# Patient Record
Sex: FEMALE | Race: WHITE | NOT HISPANIC OR LATINO | Employment: OTHER | URBAN - METROPOLITAN AREA
[De-identification: names, ages, dates, MRNs, and addresses within clinical notes are randomized per-mention and may not be internally consistent; named-entity substitution may affect disease eponyms.]

---

## 2017-10-24 ENCOUNTER — TRANSCRIBE ORDERS (OUTPATIENT)
Dept: ADMINISTRATIVE | Facility: HOSPITAL | Age: 67
End: 2017-10-24

## 2017-10-24 DIAGNOSIS — R92.8 ABNORMAL MAMMOGRAM: Primary | ICD-10-CM

## 2017-11-03 ENCOUNTER — APPOINTMENT (OUTPATIENT)
Dept: MAMMOGRAPHY | Facility: HOSPITAL | Age: 67
End: 2017-11-03
Attending: INTERNAL MEDICINE

## 2017-12-06 ENCOUNTER — APPOINTMENT (OUTPATIENT)
Dept: MAMMOGRAPHY | Facility: HOSPITAL | Age: 67
End: 2017-12-06
Attending: INTERNAL MEDICINE

## 2017-12-19 ENCOUNTER — HOSPITAL ENCOUNTER (OUTPATIENT)
Dept: MAMMOGRAPHY | Facility: HOSPITAL | Age: 67
Discharge: HOME | End: 2017-12-19
Attending: INTERNAL MEDICINE | Admitting: INTERNAL MEDICINE

## 2017-12-19 DIAGNOSIS — R92.8 ABNORMAL MAMMOGRAM: ICD-10-CM

## 2017-12-19 PROCEDURE — G0279 TOMOSYNTHESIS, MAMMO: HCPCS

## 2017-12-19 PROCEDURE — G0279 TOMOSYNTHESIS, MAMMO: HCPCS | Performed by: RADIOLOGY

## 2017-12-19 PROCEDURE — G0204 DX MAMMO INCL CAD BI: HCPCS

## 2017-12-19 PROCEDURE — G0204 DX MAMMO INCL CAD BI: HCPCS | Performed by: RADIOLOGY

## 2017-12-21 ENCOUNTER — APPOINTMENT (OUTPATIENT)
Dept: MAMMOGRAPHY | Facility: HOSPITAL | Age: 67
End: 2017-12-21
Attending: INTERNAL MEDICINE

## 2019-01-08 ENCOUNTER — TRANSCRIBE ORDERS (OUTPATIENT)
Dept: MAMMOGRAPHY | Facility: HOSPITAL | Age: 69
End: 2019-01-08

## 2019-01-08 ENCOUNTER — HOSPITAL ENCOUNTER (OUTPATIENT)
Dept: MAMMOGRAPHY | Facility: HOSPITAL | Age: 69
Discharge: HOME | End: 2019-01-08
Attending: INTERNAL MEDICINE | Admitting: INTERNAL MEDICINE

## 2019-01-08 DIAGNOSIS — Z12.31 VISIT FOR SCREENING MAMMOGRAM: Primary | ICD-10-CM

## 2019-01-08 DIAGNOSIS — Z12.31 VISIT FOR SCREENING MAMMOGRAM: ICD-10-CM

## 2019-01-08 PROCEDURE — 77063 BREAST TOMOSYNTHESIS BI: CPT

## 2019-01-08 PROCEDURE — 77063 BREAST TOMOSYNTHESIS BI: CPT | Performed by: RADIOLOGY

## 2019-01-08 PROCEDURE — 77067 SCR MAMMO BI INCL CAD: CPT | Performed by: RADIOLOGY

## 2019-01-08 PROCEDURE — 77067 SCR MAMMO BI INCL CAD: CPT

## 2020-01-08 ENCOUNTER — TRANSCRIBE ORDERS (OUTPATIENT)
Dept: MAMMOGRAPHY | Facility: HOSPITAL | Age: 70
End: 2020-01-08

## 2020-01-08 ENCOUNTER — HOSPITAL ENCOUNTER (OUTPATIENT)
Dept: MAMMOGRAPHY | Facility: HOSPITAL | Age: 70
Discharge: HOME | End: 2020-01-08
Admitting: INTERNAL MEDICINE

## 2020-01-08 DIAGNOSIS — Z12.31 VISIT FOR SCREENING MAMMOGRAM: ICD-10-CM

## 2020-01-08 DIAGNOSIS — Z12.31 VISIT FOR SCREENING MAMMOGRAM: Primary | ICD-10-CM

## 2020-01-08 PROCEDURE — 77063 BREAST TOMOSYNTHESIS BI: CPT

## 2020-01-08 PROCEDURE — 77067 SCR MAMMO BI INCL CAD: CPT

## 2020-01-08 PROCEDURE — 77067 SCR MAMMO BI INCL CAD: CPT | Performed by: RADIOLOGY

## 2020-01-08 PROCEDURE — 77063 BREAST TOMOSYNTHESIS BI: CPT | Performed by: RADIOLOGY

## 2020-12-01 ENCOUNTER — TRANSCRIBE ORDERS (OUTPATIENT)
Dept: ADMINISTRATIVE | Facility: HOSPITAL | Age: 70
End: 2020-12-01

## 2020-12-01 DIAGNOSIS — Z12.31 VISIT FOR SCREENING MAMMOGRAM: Primary | ICD-10-CM

## 2021-01-08 ENCOUNTER — LAB (OUTPATIENT)
Dept: LAB | Facility: HOSPITAL | Age: 71
End: 2021-01-08

## 2021-01-08 ENCOUNTER — OFFICE VISIT (OUTPATIENT)
Dept: ENDOCRINOLOGY | Facility: CLINIC | Age: 71
End: 2021-01-08

## 2021-01-08 VITALS
BODY MASS INDEX: 34.83 KG/M2 | RESPIRATION RATE: 16 BRPM | OXYGEN SATURATION: 99 % | SYSTOLIC BLOOD PRESSURE: 122 MMHG | HEIGHT: 64 IN | TEMPERATURE: 97.6 F | HEART RATE: 65 BPM | DIASTOLIC BLOOD PRESSURE: 82 MMHG | WEIGHT: 204 LBS

## 2021-01-08 DIAGNOSIS — E04.2 MULTINODULAR GOITER: Primary | ICD-10-CM

## 2021-01-08 DIAGNOSIS — E04.2 MULTINODULAR GOITER: ICD-10-CM

## 2021-01-08 DIAGNOSIS — R73.03 PREDIABETES: ICD-10-CM

## 2021-01-08 LAB
EXPIRATION DATE: ABNORMAL
HBA1C MFR BLD: 6.4 %
Lab: ABNORMAL

## 2021-01-08 PROCEDURE — 99213 OFFICE O/P EST LOW 20 MIN: CPT | Performed by: INTERNAL MEDICINE

## 2021-01-08 PROCEDURE — 83036 HEMOGLOBIN GLYCOSYLATED A1C: CPT | Performed by: INTERNAL MEDICINE

## 2021-01-08 PROCEDURE — 84443 ASSAY THYROID STIM HORMONE: CPT

## 2021-01-08 RX ORDER — SERTRALINE HYDROCHLORIDE 25 MG/1
37 TABLET, FILM COATED ORAL DAILY
COMMUNITY
End: 2021-07-22

## 2021-01-08 RX ORDER — LANCETS
EACH MISCELLANEOUS
Qty: 100 EACH | Refills: 3 | Status: SHIPPED | OUTPATIENT
Start: 2021-01-08

## 2021-01-08 RX ORDER — BLOOD SUGAR DIAGNOSTIC
STRIP MISCELLANEOUS
Qty: 100 EACH | Refills: 3 | Status: SHIPPED | OUTPATIENT
Start: 2021-01-08

## 2021-01-08 RX ORDER — METOPROLOL SUCCINATE 25 MG/1
TABLET, EXTENDED RELEASE ORAL DAILY
COMMUNITY
Start: 2020-12-23

## 2021-01-08 RX ORDER — HYDROCHLOROTHIAZIDE 12.5 MG/1
6.25 CAPSULE, GELATIN COATED ORAL AS NEEDED
COMMUNITY

## 2021-01-08 RX ORDER — LISINOPRIL 40 MG/1
40 TABLET ORAL DAILY
COMMUNITY

## 2021-01-08 NOTE — PROGRESS NOTES
"     Office Note      Date: 2021  Patient Name: Madai Cheney  MRN: [id number]  : 1950    Chief Complaint   Patient presents with   • Goiter     follow up, discuss sugar testing kit       History of Present Illness:   Madai Cheney is a 70 y.o. female who presents for Goiter (follow up, discuss sugar testing kit)    She isn't taking any thyroid meds. She hasn't noted any change in the size of her neck. She  denies any compressive sxs. She notes occ sharp pains under her jaw. She c/o fatigue.  She notes weight gain.  She denies any others sxs of hypo- or hyperthyroidism at this time.     She isn't taking any DM meds. She isn't doing FSBS currently. She notes occ symptoms of  hypoglycemia. She is on ACE-I.      Subjective      Review of Systems:   Review of Systems   Constitutional: Positive for fatigue and unexpected weight change.   Cardiovascular: Negative.    Gastrointestinal: Negative.    Endocrine: Negative.        The following portions of the patient's history were reviewed and updated as appropriate: allergies, current medications, past family history, past medical history, past social history, past surgical history and problem list.    Objective     Visit Vitals  /82   Pulse 65   Temp 97.6 °F (36.4 °C) (Temporal)   Resp 16   Ht 162.6 cm (64\")   Wt 92.5 kg (204 lb)   SpO2 99%   Breastfeeding No   BMI 35.02 kg/m²       Physical Exam:  Physical Exam  Constitutional:       Appearance: Normal appearance.   Neck:      Thyroid: No thyroid mass, thyromegaly or thyroid tenderness.   Lymphadenopathy:      Cervical: No cervical adenopathy.   Neurological:      Mental Status: She is alert.         Labs:    TSH  No results found for: TSHBASE     Free T4  No results found for: FREET4    T3  No results found for: S1WJACQ      TPO  No results found for: THYROIDAB    TG AB  No results found for: THGAB    TG  No results found for: THYROGLB    CBC w/DIFF  Lab Results   Component Value Date    WBC 11.86 (H) " 07/05/2016    RBC 4.80 07/05/2016    HGB 13.4 07/05/2016    HCT 41.4 07/05/2016    MCV 86.3 07/05/2016    MCH 27.9 07/05/2016    MCHC 32.4 07/05/2016    RDW 14.0 07/05/2016    RDWSD 43.9 07/05/2016    MPV 10.7 07/05/2016     07/05/2016    NEUTRORELPCT 50.9 07/05/2016    LYMPHORELPCT 34.2 07/05/2016    MONORELPCT 8.9 07/05/2016    EOSRELPCT 4.4 (H) 07/05/2016    BASORELPCT 0.4 07/05/2016    AUTOIGPER 1.2 (H) 07/05/2016    NEUTROABS 6.03 07/05/2016    LYMPHSABS 4.06 07/05/2016    MONOSABS 1.06 (H) 07/05/2016    EOSABS 0.52 (H) 07/05/2016    BASOSABS 0.05 07/05/2016    AUTOIGNUM 0.14 (H) 07/05/2016           Assessment / Plan      Assessment & Plan:  Problem List Items Addressed This Visit        Other    Prediabetes    Current Assessment & Plan     A1c not too bad at 6.4%.  Work on diet/exercise/weight loss.         Relevant Orders    POC Glycosylated Hemoglobin (Hb A1C) (Completed)    Multinodular goiter - Primary    Current Assessment & Plan     Goiter stable to palpation.  Check TSH today.  Plan for another u/s in a year.         Relevant Medications    metoprolol succinate XL (TOPROL-XL) 25 MG 24 hr tablet    Other Relevant Orders    TSH           Return in about 6 months (around 7/8/2021) for Recheck with A1c, TSH.    Jermain Blackman MD   01/08/2021

## 2021-01-09 LAB — TSH SERPL DL<=0.05 MIU/L-ACNC: 1.82 UIU/ML (ref 0.27–4.2)

## 2021-02-08 ENCOUNTER — LAB (OUTPATIENT)
Dept: LAB | Facility: HOSPITAL | Age: 71
End: 2021-02-08

## 2021-02-08 ENCOUNTER — CONSULT (OUTPATIENT)
Dept: ONCOLOGY | Facility: CLINIC | Age: 71
End: 2021-02-08

## 2021-02-08 VITALS
WEIGHT: 200 LBS | SYSTOLIC BLOOD PRESSURE: 185 MMHG | RESPIRATION RATE: 18 BRPM | HEART RATE: 80 BPM | DIASTOLIC BLOOD PRESSURE: 80 MMHG | HEIGHT: 63 IN | TEMPERATURE: 96.2 F | BODY MASS INDEX: 35.44 KG/M2 | OXYGEN SATURATION: 98 %

## 2021-02-08 DIAGNOSIS — N60.19 FIBROCYSTIC BREAST CHANGES, UNSPECIFIED LATERALITY: ICD-10-CM

## 2021-02-08 DIAGNOSIS — Z85.060 PERSONAL HISTORY OF MALIGNANT CARCINOID TUMOR OF SMALL INTESTINE: ICD-10-CM

## 2021-02-08 DIAGNOSIS — Z12.31 ENCOUNTER FOR SCREENING MAMMOGRAM FOR MALIGNANT NEOPLASM OF BREAST: ICD-10-CM

## 2021-02-08 DIAGNOSIS — C7A.098 MALIGNANT CARCINOID TUMORS OF OTHER SITES (HCC): ICD-10-CM

## 2021-02-08 DIAGNOSIS — Z85.060 PERSONAL HISTORY OF MALIGNANT CARCINOID TUMOR OF SMALL INTESTINE: Primary | ICD-10-CM

## 2021-02-08 LAB
ALBUMIN SERPL-MCNC: 4.3 G/DL (ref 3.5–5.2)
ALBUMIN/GLOB SERPL: 1.7 G/DL
ALP SERPL-CCNC: 70 U/L (ref 39–117)
ALT SERPL W P-5'-P-CCNC: 13 U/L (ref 1–33)
ANION GAP SERPL CALCULATED.3IONS-SCNC: 5 MMOL/L (ref 5–15)
AST SERPL-CCNC: 13 U/L (ref 1–32)
BILIRUB SERPL-MCNC: 0.2 MG/DL (ref 0–1.2)
BUN SERPL-MCNC: 10 MG/DL (ref 8–23)
BUN/CREAT SERPL: 12.3 (ref 7–25)
CALCIUM SPEC-SCNC: 9.6 MG/DL (ref 8.6–10.5)
CHLORIDE SERPL-SCNC: 108 MMOL/L (ref 98–107)
CO2 SERPL-SCNC: 30 MMOL/L (ref 22–29)
CREAT SERPL-MCNC: 0.81 MG/DL (ref 0.57–1)
ERYTHROCYTE [DISTWIDTH] IN BLOOD BY AUTOMATED COUNT: 16.3 % (ref 12.3–15.4)
GFR SERPL CREATININE-BSD FRML MDRD: 70 ML/MIN/1.73
GLOBULIN UR ELPH-MCNC: 2.6 GM/DL
GLUCOSE SERPL-MCNC: 111 MG/DL (ref 65–99)
HCT VFR BLD AUTO: 42.3 % (ref 34–46.6)
HGB BLD-MCNC: 13 G/DL (ref 12–15.9)
LYMPHOCYTES # BLD AUTO: 3 10*3/MM3 (ref 0.7–3.1)
LYMPHOCYTES NFR BLD AUTO: 33.3 % (ref 19.6–45.3)
MCH RBC QN AUTO: 26.5 PG (ref 26.6–33)
MCHC RBC AUTO-ENTMCNC: 30.8 G/DL (ref 31.5–35.7)
MCV RBC AUTO: 86 FL (ref 79–97)
MONOCYTES # BLD AUTO: 0.6 10*3/MM3 (ref 0.1–0.9)
MONOCYTES NFR BLD AUTO: 6.3 % (ref 5–12)
NEUTROPHILS NFR BLD AUTO: 5.4 10*3/MM3 (ref 1.7–7)
NEUTROPHILS NFR BLD AUTO: 60.4 % (ref 42.7–76)
PLATELET # BLD AUTO: 276 10*3/MM3 (ref 140–450)
PMV BLD AUTO: 8.4 FL (ref 6–12)
POTASSIUM SERPL-SCNC: 5.1 MMOL/L (ref 3.5–5.2)
PROT SERPL-MCNC: 6.9 G/DL (ref 6–8.5)
RBC # BLD AUTO: 4.93 10*6/MM3 (ref 3.77–5.28)
SODIUM SERPL-SCNC: 143 MMOL/L (ref 136–145)
WBC # BLD AUTO: 9 10*3/MM3 (ref 3.4–10.8)

## 2021-02-08 PROCEDURE — 86316 IMMUNOASSAY TUMOR OTHER: CPT

## 2021-02-08 PROCEDURE — 83497 ASSAY OF 5-HIAA: CPT

## 2021-02-08 PROCEDURE — 36415 COLL VENOUS BLD VENIPUNCTURE: CPT

## 2021-02-08 PROCEDURE — 80053 COMPREHEN METABOLIC PANEL: CPT

## 2021-02-08 PROCEDURE — 99204 OFFICE O/P NEW MOD 45 MIN: CPT | Performed by: INTERNAL MEDICINE

## 2021-02-08 PROCEDURE — 85025 COMPLETE CBC W/AUTO DIFF WBC: CPT
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
## 2021-02-10 LAB — CGA SERPL-MCNC: 57.2 NG/ML (ref 0–101.8)

## 2021-02-19 LAB — 5OH-INDOLEACETATE SERPL-MCNC: 9.1 NG/ML

## 2021-03-08 ENCOUNTER — TELEPHONE (OUTPATIENT)
Dept: ONCOLOGY | Facility: CLINIC | Age: 71
End: 2021-03-08

## 2021-03-08 ENCOUNTER — HOSPITAL ENCOUNTER (OUTPATIENT)
Dept: MAMMOGRAPHY | Facility: HOSPITAL | Age: 71
Discharge: HOME | End: 2021-03-08
Admitting: INTERNAL MEDICINE

## 2021-03-08 ENCOUNTER — OFFICE VISIT (OUTPATIENT)
Dept: ONCOLOGY | Facility: CLINIC | Age: 71
End: 2021-03-08

## 2021-03-08 VITALS
SYSTOLIC BLOOD PRESSURE: 158 MMHG | RESPIRATION RATE: 20 BRPM | WEIGHT: 200.6 LBS | DIASTOLIC BLOOD PRESSURE: 83 MMHG | OXYGEN SATURATION: 95 % | HEIGHT: 63 IN | HEART RATE: 77 BPM | TEMPERATURE: 97.8 F | BODY MASS INDEX: 35.54 KG/M2

## 2021-03-08 DIAGNOSIS — J34.89 NASAL SORE: ICD-10-CM

## 2021-03-08 DIAGNOSIS — Z12.31 ENCOUNTER FOR SCREENING MAMMOGRAM FOR MALIGNANT NEOPLASM OF BREAST: ICD-10-CM

## 2021-03-08 DIAGNOSIS — Z85.060 PERSONAL HISTORY OF MALIGNANT CARCINOID TUMOR OF SMALL INTESTINE: Primary | ICD-10-CM

## 2021-03-08 DIAGNOSIS — N60.19 FIBROCYSTIC BREAST CHANGES, UNSPECIFIED LATERALITY: ICD-10-CM

## 2021-03-08 PROCEDURE — 99214 OFFICE O/P EST MOD 30 MIN: CPT | Performed by: INTERNAL MEDICINE

## 2021-03-08 PROCEDURE — 77067 SCR MAMMO BI INCL CAD: CPT

## 2021-03-08 PROCEDURE — 77063 BREAST TOMOSYNTHESIS BI: CPT

## 2021-03-08 PROCEDURE — 77063 BREAST TOMOSYNTHESIS BI: CPT | Performed by: RADIOLOGY

## 2021-03-08 PROCEDURE — 77067 SCR MAMMO BI INCL CAD: CPT | Performed by: RADIOLOGY

## 2021-03-08 RX ORDER — ERGOCALCIFEROL 1.25 MG/1
50000 CAPSULE ORAL WEEKLY
COMMUNITY

## 2021-03-08 NOTE — TELEPHONE ENCOUNTER
Caller: PT    Relationship to patient: PT    Best call back number: [phone]    PT REQUESTING INFORMATION FROM LABS DRAW, 2/8    PLEASE RETURN CALL     THANK YOU

## 2021-03-08 NOTE — TELEPHONE ENCOUNTER
Discussed labs with Dr. Reyes, lab results were also dicussed in today's appointment. Called patient to advise labs are okay and make sure patient was able to get labs drawn that had been ordered today. No answer, left message.

## 2021-03-08 NOTE — PROGRESS NOTES
Follow Up Office Visit      Date: 2021     Patient Name: Madai Cheney  MRN: [id number]  : 1950  Referring Physician: Louisa Deng     Chief Complaint:  Follow-up for history of carcinoid of the ileum     History of Present Illness: Madai Cheney is a pleasant 70 y.o. female past medical history of ileal carcinoid tumor status post resection in , hypertension and anxiety who presents today for evaluation of her history of carcinoid tumor. The patient was initially diagnosed in .  She had a resection in 2001 one of her ileum which was positive for carcinoid.  2 lymph nodes were positive for disease.  She was followed by Dr. Cortez did not require any adjuvant treatment.  She had been followed with 5-HIAA which was last checked in 2016 and was normal.  She states over the last several weeks she has had worsening flushing episodes, hot flashes, and has had 2 episodes of passing out.  Has noticed some mild increase in diarrhea as well as pruritus.  She denies any unexplained fevers, night sweats, vision changes.    Interval History:   Presents to clinic for follow-up.  Has not had any hot flashing or flushing episodes since her last visit.  Has noticed a growing right nasal sore over the past several weeks but has been causing her more pain during this time.  Is also noticed a right arm lesion has been growing in size for the past 6 months to a year.  Otherwise she denies any fevers, weight loss, chest pain, palpitation, shortness of breath, cough, nausea, diarrhea    Oncology History:    Oncology/Hematology History    No history exists.       Subjective      Review of Systems:   Constitutional: Negative for fevers, chills, or weight loss  Eyes: Negative for blurred vision or discharge         Ear/Nose/Throat: Negative for difficulty swallowing, sore throat, LAD                                                       Respiratory: Negative for cough, SOA, wheezing                                                                                         Cardiovascular: Negative for chest pain or palpitations                                                                  Gastrointestinal: Negative for nausea, vomiting or diarrhea                                                                     Genitourinary: Negative for dysuria or hematuria                                                                                           Musculoskeletal: Negative for any joint pains or muscle aches                                                                        Neurologic: Negative for any weakness, headaches, dizziness                                                                         Hematologic: Negative for any easy bleeding or bruising                                                                                   Psychiatric: Negative for anxiety or depression                          Past Medical History/Past Surgical History/ Family History/ Social History: Reviewed by me and unchanged from my previous documentation done on February 2021.     Medications:     Current Outpatient Medications:   •  Accu-Chek FastClix Lancets misc, Use qd, Disp: 100 each, Rfl: 3  •  CYCLOBENZAPRINE HCL PO, Take  by mouth., Disp: , Rfl:   •  estradiol (MINIVELLE, VIVELLE-DOT) 0.1 MG/24HR patch, Place 1 patch on the skin as directed by provider 2 (Two) Times a Week., Disp: , Rfl:   •  glucose blood (Accu-Chek Guide) test strip, Use once daily, Disp: 100 each, Rfl: 3  •  hydroCHLOROthiazide (MICROZIDE) 12.5 MG capsule, Take 6.25 mg by mouth As Needed., Disp: , Rfl:   •  lisinopril (PRINIVIL,ZESTRIL) 40 MG tablet, Take 40 mg by mouth Daily., Disp: , Rfl:   •  metoprolol succinate XL (TOPROL-XL) 25 MG 24 hr tablet, Take  by mouth Daily., Disp: , Rfl:   •  sertraline (ZOLOFT) 25 MG tablet, Take 37 mg by mouth Daily. 1.5 tablets daily, Disp: , Rfl:   •  vitamin D (ERGOCALCIFEROL) 1.25 MG (41367 UT) capsule capsule,  "Take 50,000 Units by mouth 1 (One) Time Per Week. Takes every 3 days 100,000, Disp: , Rfl:     Allergies:   Allergies   Allergen Reactions   • Ambien [Zolpidem]    • Norco [Hydrocodone-Acetaminophen]    • Other      Novacaine   • Oxycodone        Objective     Physical Exam:  Vital Signs:   Vitals:    03/08/21 1007   BP: 158/83   Pulse: 77   Resp: 20   Temp: 97.8 °F (36.6 °C)   TempSrc: Temporal   SpO2: 95%   Weight: 91 kg (200 lb 9.6 oz)   Height: 158.8 cm (62.5\")   PainSc:   5   PainLoc: Back     Pain Score    03/08/21 1007   PainSc:   5   PainLoc: Back     ECOG Performance Status: 1 - Symptomatic but completely ambulatory    Constitutional: NAD, ECOG 1  Eyes: PERRLA, scleral anicteric  ENT: No LAD, no thyromegaly, right nasal sore noted, appears mildly ulcerated, nonbleeding  Respiratory: CTAB, no wheezing, rales, rhonchi  Cardiovascular: RRR, no murmurs, pulses 2+ bilaterally  Abdomen: soft, NT/ND, no HSM  Musculoskeletal: strength 5/5 bilaterally, no c/c/e  Neurologic: A&O x 3, CN II-XII intact grossly    Results Review:   No visits with results within 2 Week(s) from this visit.   Latest known visit with results is:   Lab on 02/08/2021   Component Date Value Ref Range Status   • CHROMOGRANIN A 02/08/2021 57.2  0.0 - 101.8 ng/mL Final    Chromogranin A performed by [employer] methodology  Values obtained with different assay methods or kits cannot be used  interchangeably.   • 5-HIAA, PLASMA 02/08/2021 9.1  ng/mL Final    This 5-HIAA result was determined using liquid  chromatography-tandem mass spectrometry (LC-MS/MS).  Values obtained cannot be evaluated interchangeably with  different assay methods or kits.  This 5-HIAA result alone cannot be interpreted as absolute  evidence of the presence or absence of disease.  The performance characteristics of this assay have been  [facility]. The result should not be used as a  diagnostic procedure without confirmation of the diagnosis  by " another medically established diagnostic product or  procedure.  Reference Range:  <22   • Glucose 02/08/2021 111* 65 - 99 mg/dL Final   • BUN 02/08/2021 10  8 - 23 mg/dL Final   • Creatinine 02/08/2021 0.81  0.57 - 1.00 mg/dL Final   • Sodium 02/08/2021 143  136 - 145 mmol/L Final   • Potassium 02/08/2021 5.1  3.5 - 5.2 mmol/L Final    Slight hemolysis detected by analyzer. Results may be affected.   • Chloride 02/08/2021 108* 98 - 107 mmol/L Final   • CO2 02/08/2021 30.0* 22.0 - 29.0 mmol/L Final   • Calcium 02/08/2021 9.6  8.6 - 10.5 mg/dL Final   • Total Protein 02/08/2021 6.9  6.0 - 8.5 g/dL Final   • Albumin 02/08/2021 4.30  3.50 - 5.20 g/dL Final   • ALT (SGPT) 02/08/2021 13  1 - 33 U/L Final   • AST (SGOT) 02/08/2021 13  1 - 32 U/L Final   • Alkaline Phosphatase 02/08/2021 70  39 - 117 U/L Final   • Total Bilirubin 02/08/2021 0.2  0.0 - 1.2 mg/dL Final   • eGFR Non  Amer 02/08/2021 70  >60 mL/min/1.73 Final   • Globulin 02/08/2021 2.6  gm/dL Final   • A/G Ratio 02/08/2021 1.7  g/dL Final   • BUN/Creatinine Ratio 02/08/2021 12.3  7.0 - 25.0 Final   • Anion Gap 02/08/2021 5.0  5.0 - 15.0 mmol/L Final   • WBC 02/08/2021 9.00  3.40 - 10.80 10*3/mm3 Final   • RBC 02/08/2021 4.93  3.77 - 5.28 10*6/mm3 Final   • Hemoglobin 02/08/2021 13.0  12.0 - 15.9 g/dL Final   • Hematocrit 02/08/2021 42.3  34.0 - 46.6 % Final   • RDW 02/08/2021 16.3* 12.3 - 15.4 % Final   • MCV 02/08/2021 86.0  79.0 - 97.0 fL Final   • MCH 02/08/2021 26.5* 26.6 - 33.0 pg Final   • MCHC 02/08/2021 30.8* 31.5 - 35.7 g/dL Final   • MPV 02/08/2021 8.4  6.0 - 12.0 fL Final   • Platelets 02/08/2021 276  140 - 450 10*3/mm3 Final   • Neutrophil % 02/08/2021 60.4  42.7 - 76.0 % Final   • Lymphocyte % 02/08/2021 33.3  19.6 - 45.3 % Final   • Monocyte % 02/08/2021 6.3  5.0 - 12.0 % Final   • Neutrophils, Absolute 02/08/2021 5.40  1.70 - 7.00 10*3/mm3 Final   • Lymphocytes, Absolute 02/08/2021 3.00  0.70 - 3.10 10*3/mm3 Final   • Monocytes, Absolute  02/08/2021 0.60  0.10 - 0.90 10*3/mm3 Final       No results found.    Assessment / Plan      Assessment/Plan:   1. 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  Did not require any adjuvant therapy.  Unfortunately pathology is not available  -Currently asymptomatic  -Repeat 5-HIAA and chromogranin A within normal limits  -No further work-up required at this time.  We will follow-up in 1 year     2. Fibrocystic breast changes, unspecified laterality  -Noted during her previous mammogram and biopsy several years ago  -Patient missed her most recent mammogram due to the weather  -Patient had mammogram completed today.  Results pending    3. Nasal sore  -Noted on exam and has been bothering patient more recently  -Referred to Dr. Corley with ENT today  -     Ambulatory Referral to ENT (Otolaryngology)    4.  Right arm skin lesion  -Per patient has been growing in size over the past year  -Advised patient to follow-up with her already established dermatologist    Madai Cheney reports a pain score of 5.  Given her pain assessment as noted, treatment options were discussed and the following options were decided upon as a follow-up plan to address the patient's pain: continuation of current treatment plan for pain.      Follow Up:   We will follow-up in 1 year     Ariel Reyes MD  Hematology and Oncology     Please note that portions of this note may have been completed with a voice recognition program. Efforts were made to edit the dictations, but occasionally words are mistranscribed.

## 2021-03-10 ENCOUNTER — TELEPHONE (OUTPATIENT)
Dept: ONCOLOGY | Facility: CLINIC | Age: 71
End: 2021-03-10

## 2021-03-10 NOTE — TELEPHONE ENCOUNTER
Discussed mammogram with Dr. Reyes, called patient to advise mammogram looks good. No answer, left message.

## 2021-03-15 ENCOUNTER — APPOINTMENT (OUTPATIENT)
Dept: MAMMOGRAPHY | Facility: HOSPITAL | Age: 71
End: 2021-03-15

## 2021-07-22 ENCOUNTER — OFFICE VISIT (OUTPATIENT)
Dept: ENDOCRINOLOGY | Facility: CLINIC | Age: 71
End: 2021-07-22

## 2021-07-22 VITALS
WEIGHT: 193 LBS | HEART RATE: 71 BPM | BODY MASS INDEX: 32.95 KG/M2 | HEIGHT: 64 IN | DIASTOLIC BLOOD PRESSURE: 80 MMHG | SYSTOLIC BLOOD PRESSURE: 150 MMHG | OXYGEN SATURATION: 95 %

## 2021-07-22 DIAGNOSIS — E04.2 MULTINODULAR GOITER: ICD-10-CM

## 2021-07-22 DIAGNOSIS — R73.03 PREDIABETES: Primary | ICD-10-CM

## 2021-07-22 DIAGNOSIS — Z85.060 PERSONAL HISTORY OF MALIGNANT CARCINOID TUMOR OF SMALL INTESTINE: ICD-10-CM

## 2021-07-22 LAB
EXPIRATION DATE: NORMAL
EXPIRATION DATE: NORMAL
GLUCOSE BLDC GLUCOMTR-MCNC: 114 MG/DL (ref 70–130)
HBA1C MFR BLD: 6.3 %
Lab: NORMAL
Lab: NORMAL

## 2021-07-22 PROCEDURE — 99213 OFFICE O/P EST LOW 20 MIN: CPT | Performed by: INTERNAL MEDICINE

## 2021-07-22 PROCEDURE — 82947 ASSAY GLUCOSE BLOOD QUANT: CPT | Performed by: INTERNAL MEDICINE

## 2021-07-22 PROCEDURE — 83036 HEMOGLOBIN GLYCOSYLATED A1C: CPT | Performed by: INTERNAL MEDICINE

## 2021-07-22 PROCEDURE — 84443 ASSAY THYROID STIM HORMONE: CPT | Performed by: INTERNAL MEDICINE

## 2021-07-22 RX ORDER — LISINOPRIL 20 MG/1
20 TABLET ORAL DAILY
COMMUNITY
Start: 2021-04-17

## 2021-07-22 RX ORDER — ESTRADIOL 0.07 MG/D
FILM, EXTENDED RELEASE TRANSDERMAL
COMMUNITY
Start: 2021-06-28

## 2021-07-22 RX ORDER — LANCING DEVICE/LANCETS
KIT MISCELLANEOUS
COMMUNITY

## 2021-07-22 NOTE — PROGRESS NOTES
"     Office Note      Date: 2021  Patient Name: Madai Cheney  MRN: [id number]  : 1950    Chief Complaint   Patient presents with   • Goiter       History of Present Illness:   Madai Cheney is a 71 y.o. female who presents for Goiter    She isn't taking any thyroid meds. She hasn't noted any change in the size of her neck. She denies any compressive sxs. She c/o fatigue.  She has been able to lose some weight.  She denies any others sxs of hypo- or hyperthyroidism at this time.      She isn't taking any DM meds. She is doing FSBS intermittently. She notes rare hypoglycemic symptoms if she is delayed eating. She is on ACE-I.     Subjective      Review of Systems:   Review of Systems   Constitutional: Positive for fatigue.   Cardiovascular: Negative.    Gastrointestinal: Negative.    Endocrine: Negative.        The following portions of the patient's history were reviewed and updated as appropriate: allergies, current medications, past family history, past medical history, past social history, past surgical history and problem list.    Objective     Visit Vitals  /80   Pulse 71   Ht 162.6 cm (64\")   Wt 87.5 kg (193 lb)   SpO2 95%   BMI 33.13 kg/m²       Physical Exam:  Physical Exam  Constitutional:       Appearance: Normal appearance.   Neck:      Thyroid: No thyroid mass, thyromegaly or thyroid tenderness.      Comments: Enlarged right AC joint  Lymphadenopathy:      Cervical: No cervical adenopathy.   Neurological:      Mental Status: She is alert.         Labs:    TSH  No results found for: TSHBASE     Free T4  No results found for: FREET4    T3  No results found for: [id number]      TPO  No results found for: THYROIDAB    TG AB  No results found for: THGAB    TG  No results found for: THYROGLB    CBC w/DIFF  Lab Results   Component Value Date    WBC 9.00 2021    RBC 4.93 2021    HGB 13.0 2021    HCT 42.3 2021    MCV 86.0 2021    MCH 26.5 (L) 2021    MCHC 30.8 " (L) 02/08/2021    RDW 16.3 (H) 02/08/2021    RDWSD 43.9 07/05/2016    MPV 8.4 02/08/2021     02/08/2021    NEUTRORELPCT 60.4 02/08/2021    LYMPHORELPCT 33.3 02/08/2021    MONORELPCT 6.3 02/08/2021    EOSRELPCT 4.4 (H) 07/05/2016    BASORELPCT 0.4 07/05/2016    AUTOIGPER 1.2 (H) 07/05/2016    NEUTROABS 5.40 02/08/2021    LYMPHSABS 3.00 02/08/2021    MONOSABS 0.60 02/08/2021    EOSABS 0.52 (H) 07/05/2016    BASOSABS 0.05 07/05/2016    AUTOIGNUM 0.14 (H) 07/05/2016           Assessment / Plan      Assessment & Plan:  Diagnoses and all orders for this visit:    1. Prediabetes (Primary)  Assessment & Plan:  A1c looks good.  Continue to work on weight loss.    Orders:  -     POC Glycosylated Hemoglobin (Hb A1C)  -     POC Glucose, Blood    2. Multinodular goiter  Assessment & Plan:  No goiter on exam.  Plan for another u/s in about 6 months.    Orders:  -     TSH; Future      Return in about 6 months (around 1/22/2022) for Recheck with A1c, TSH, neck u/s - in [place]..    Jermain Blackman MD   07/22/2021

## 2021-07-23 LAB — TSH SERPL DL<=0.05 MIU/L-ACNC: 1.71 UIU/ML (ref 0.27–4.2)

## 2023-11-13 ENCOUNTER — OFFICE VISIT (OUTPATIENT)
Dept: CARDIOLOGY | Facility: CLINIC | Age: 73
End: 2023-11-13
Payer: MEDICARE

## 2023-11-13 VITALS
WEIGHT: 197.4 LBS | HEIGHT: 64 IN | HEART RATE: 62 BPM | SYSTOLIC BLOOD PRESSURE: 180 MMHG | BODY MASS INDEX: 33.7 KG/M2 | DIASTOLIC BLOOD PRESSURE: 102 MMHG

## 2023-11-13 DIAGNOSIS — R55 NEAR SYNCOPE: ICD-10-CM

## 2023-11-13 DIAGNOSIS — I10 PRIMARY HYPERTENSION: Primary | ICD-10-CM

## 2023-11-13 DIAGNOSIS — E88.810 METABOLIC SYNDROME: ICD-10-CM

## 2023-11-13 DIAGNOSIS — I25.6 SILENT MYOCARDIAL ISCHEMIA: ICD-10-CM

## 2023-11-13 DIAGNOSIS — R01.1 HEART MURMUR: ICD-10-CM

## 2023-11-13 DIAGNOSIS — Z85.060 PERSONAL HISTORY OF MALIGNANT CARCINOID TUMOR OF SMALL INTESTINE: ICD-10-CM

## 2023-11-13 PROCEDURE — 99204 OFFICE O/P NEW MOD 45 MIN: CPT | Performed by: INTERNAL MEDICINE

## 2023-11-13 PROCEDURE — 1159F MED LIST DOCD IN RCRD: CPT | Performed by: INTERNAL MEDICINE

## 2023-11-13 PROCEDURE — 3077F SYST BP >= 140 MM HG: CPT | Performed by: INTERNAL MEDICINE

## 2023-11-13 PROCEDURE — 93000 ELECTROCARDIOGRAM COMPLETE: CPT | Performed by: INTERNAL MEDICINE

## 2023-11-13 PROCEDURE — 3080F DIAST BP >= 90 MM HG: CPT | Performed by: INTERNAL MEDICINE

## 2023-11-13 PROCEDURE — 1160F RVW MEDS BY RX/DR IN RCRD: CPT | Performed by: INTERNAL MEDICINE

## 2023-11-13 RX ORDER — AMLODIPINE BESYLATE 10 MG/1
10 TABLET ORAL DAILY
Qty: 30 TABLET | Refills: 11 | Status: SHIPPED | OUTPATIENT
Start: 2023-11-13

## 2023-11-13 RX ORDER — FAMOTIDINE 20 MG
1 TABLET ORAL DAILY
COMMUNITY
Start: 2023-09-01

## 2023-11-13 RX ORDER — CARVEDILOL 12.5 MG/1
1 TABLET ORAL 2 TIMES DAILY WITH MEALS
COMMUNITY
Start: 2023-06-17
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
## 2023-11-28 ENCOUNTER — HOSPITAL ENCOUNTER (OUTPATIENT)
Dept: CARDIOLOGY | Facility: HOSPITAL | Age: 73
Discharge: HOME | End: 2023-11-28
Payer: MEDICARE

## 2023-11-28 DIAGNOSIS — R01.1 HEART MURMUR: ICD-10-CM

## 2023-11-28 DIAGNOSIS — I25.6 SILENT MYOCARDIAL ISCHEMIA: ICD-10-CM

## 2023-11-28 DIAGNOSIS — R55 NEAR SYNCOPE: ICD-10-CM

## 2023-11-28 LAB
BH CV ECHO MEAS - ACS: 1.89 CM
BH CV ECHO MEAS - AI P1/2T: 837.1 MSEC
BH CV ECHO MEAS - AO MAX PG: 8.8 MMHG
BH CV ECHO MEAS - AO MEAN PG: 5 MMHG
BH CV ECHO MEAS - AO ROOT DIAM: 3.2 CM
BH CV ECHO MEAS - AO V2 MAX: 148 CM/SEC
BH CV ECHO MEAS - AO V2 VTI: 36.1 CM
BH CV ECHO MEAS - EDV(CUBED): 72.5 ML
BH CV ECHO MEAS - EDV(MOD-SP4): 72.6 ML
BH CV ECHO MEAS - EF(MOD-SP4): 50.1 %
BH CV ECHO MEAS - EF_3D-VOL: 52 %
BH CV ECHO MEAS - ESV(CUBED): 20.6 ML
BH CV ECHO MEAS - ESV(MOD-SP4): 36.2 ML
BH CV ECHO MEAS - FS: 34.3 %
BH CV ECHO MEAS - IVS/LVPW: 1.01 CM
BH CV ECHO MEAS - IVSD: 1.48 CM
BH CV ECHO MEAS - LA DIMENSION: 4 CM
BH CV ECHO MEAS - LAT PEAK E' VEL: 9.4 CM/SEC
BH CV ECHO MEAS - LV DIASTOLIC VOL/BSA (35-75): 37.4 CM2
BH CV ECHO MEAS - LV MASS(C)D: 239.3 GRAMS
BH CV ECHO MEAS - LV SYSTOLIC VOL/BSA (12-30): 18.6 CM2
BH CV ECHO MEAS - LVIDD: 4.2 CM
BH CV ECHO MEAS - LVIDS: 2.7 CM
BH CV ECHO MEAS - LVPWD: 1.46 CM
BH CV ECHO MEAS - MED PEAK E' VEL: 5.7 CM/SEC
BH CV ECHO MEAS - MV A MAX VEL: 94.3 CM/SEC
BH CV ECHO MEAS - MV DEC TIME: 0.21 SEC
BH CV ECHO MEAS - MV E MAX VEL: 113 CM/SEC
BH CV ECHO MEAS - MV E/A: 1.2
BH CV ECHO MEAS - RAP SYSTOLE: 10 MMHG
BH CV ECHO MEAS - RVSP: 33.1 MMHG
BH CV ECHO MEAS - SI(MOD-SP4): 18.7 ML/M2
BH CV ECHO MEAS - SV(MOD-SP4): 36.4 ML
BH CV ECHO MEAS - TR MAX PG: 23.1 MMHG
BH CV ECHO MEAS - TR MAX VEL: 240.3 CM/SEC
BH CV ECHO MEASUREMENTS AVERAGE E/E' RATIO: 14.97
BH CV REST NUCLEAR ISOTOPE DOSE: 10 MCI
BH CV STRESS COMMENTS STAGE 1: NORMAL
BH CV STRESS DOSE REGADENOSON STAGE 1: 0.4
BH CV STRESS DURATION MIN STAGE 1: 0
BH CV STRESS DURATION SEC STAGE 1: 10
BH CV STRESS NUCLEAR ISOTOPE DOSE: 30 MCI
BH CV STRESS PROTOCOL 1: NORMAL
BH CV STRESS RECOVERY BP: NORMAL MMHG
BH CV STRESS RECOVERY HR: 71 BPM
BH CV STRESS STAGE 1: 1
BH CV XLRA - RV BASE: 3.6 CM
BH CV XLRA - RV LENGTH: 7.9 CM
BH CV XLRA - RV MID: 2.46 CM
LEFT ATRIUM VOLUME INDEX: 28.3 ML/M2
LV EF NUC BP: 66 %
MAXIMAL PREDICTED HEART RATE: 147 BPM
PERCENT MAX PREDICTED HR: 56.46 %
STRESS BASELINE BP: NORMAL MMHG
STRESS BASELINE HR: 59 BPM
STRESS PERCENT HR: 66 %
STRESS POST PEAK BP: NORMAL MMHG
STRESS POST PEAK HR: 83 BPM
STRESS TARGET HR: 125 BPM

## 2023-11-28 PROCEDURE — A9500 TC99M SESTAMIBI: HCPCS | Performed by: INTERNAL MEDICINE

## 2023-11-28 PROCEDURE — 93017 CV STRESS TEST TRACING ONLY: CPT

## 2023-11-28 PROCEDURE — 25010000002 REGADENOSON 0.4 MG/5ML SOLUTION: Performed by: INTERNAL MEDICINE

## 2023-11-28 PROCEDURE — 93306 TTE W/DOPPLER COMPLETE: CPT

## 2023-11-28 PROCEDURE — 0 TECHNETIUM SESTAMIBI: Performed by: INTERNAL MEDICINE

## 2023-11-28 PROCEDURE — 78452 HT MUSCLE IMAGE SPECT MULT: CPT

## 2023-11-28 RX ORDER — REGADENOSON 0.08 MG/ML
0.4 INJECTION, SOLUTION INTRAVENOUS
Status: COMPLETED | OUTPATIENT
Start: 2023-11-28 | End: 2023-11-28

## 2023-11-28 RX ADMIN — TECHNETIUM TC 99M SESTAMIBI 1 DOSE: 1 INJECTION INTRAVENOUS at 08:08

## 2023-11-28 RX ADMIN — REGADENOSON 0.4 MG: 0.08 INJECTION, SOLUTION INTRAVENOUS at 10:03

## 2023-11-28 RX ADMIN — TECHNETIUM TC 99M SESTAMIBI 1 DOSE: 1 INJECTION INTRAVENOUS at 10:12

## 2023-11-29 ENCOUNTER — TELEPHONE (OUTPATIENT)
Dept: CARDIOLOGY | Facility: CLINIC | Age: 73
End: 2023-11-29
Payer: MEDICARE

## 2023-11-29 RX ORDER — NIFEDIPINE 30 MG/1
30 TABLET, EXTENDED RELEASE ORAL DAILY
Qty: 90 TABLET | Refills: 3 | Status: SHIPPED | OUTPATIENT
Start: 2023-11-29

## 2023-11-29 NOTE — PROGRESS NOTES
Pt aware of results and recommendations to change Norvasc to Procardia XL 30 mg once a day and increase to 60 mg if needed. Pt aware that we will send in 30 mg tabs of the Procardia XL, to take daily, monitor vitals and call with any problems so Procardia can be increased to 60 mg daily. Understanding voiced.

## 2023-11-29 NOTE — TELEPHONE ENCOUNTER
----- Message from Kathleen Carballo MD sent at 11/29/2023  6:45 AM EST -----  Change Norvasc to Procardia

## 2023-11-29 NOTE — TELEPHONE ENCOUNTER
Pt aware of results and recommendations to stop Norvasc and start Procardia XL 30 mg once a day and increase to 60 mg if needed. Pt aware that we will send in 30 mg tabs of the Procardia XL, to take daily, monitor vitals and call with any problems so Procardia can be increased to 60 mg daily if BP still not controlled.  Understanding voiced.

## 2024-02-06 ENCOUNTER — OFFICE VISIT (OUTPATIENT)
Dept: ONCOLOGY | Facility: CLINIC | Age: 74
End: 2024-02-06
Payer: MEDICARE

## 2024-02-06 ENCOUNTER — LAB (OUTPATIENT)
Dept: LAB | Facility: HOSPITAL | Age: 74
End: 2024-02-06
Payer: MEDICARE

## 2024-02-06 VITALS
DIASTOLIC BLOOD PRESSURE: 74 MMHG | TEMPERATURE: 98.5 F | BODY MASS INDEX: 33.63 KG/M2 | HEART RATE: 70 BPM | RESPIRATION RATE: 18 BRPM | OXYGEN SATURATION: 97 % | SYSTOLIC BLOOD PRESSURE: 149 MMHG | WEIGHT: 197 LBS | HEIGHT: 64 IN

## 2024-02-06 DIAGNOSIS — Z85.060 PERSONAL HISTORY OF MALIGNANT CARCINOID TUMOR OF SMALL INTESTINE: Primary | ICD-10-CM

## 2024-02-06 DIAGNOSIS — C7A.012 MALIGNANT CARCINOID TUMOR OF THE ILEUM: ICD-10-CM

## 2024-02-06 DIAGNOSIS — Z85.060 PERSONAL HISTORY OF MALIGNANT CARCINOID TUMOR OF SMALL INTESTINE: ICD-10-CM

## 2024-02-06 LAB
ALBUMIN SERPL-MCNC: 4.1 G/DL (ref 3.5–5.2)
ALBUMIN/GLOB SERPL: 1.6 G/DL
ALP SERPL-CCNC: 67 U/L (ref 39–117)
ALT SERPL W P-5'-P-CCNC: 10 U/L (ref 1–33)
ANION GAP SERPL CALCULATED.3IONS-SCNC: 10 MMOL/L (ref 5–15)
AST SERPL-CCNC: 12 U/L (ref 1–32)
BASOPHILS # BLD AUTO: 0.03 10*3/MM3 (ref 0–0.2)
BASOPHILS NFR BLD AUTO: 0.4 % (ref 0–1.5)
BILIRUB SERPL-MCNC: 0.3 MG/DL (ref 0–1.2)
BUN SERPL-MCNC: 12 MG/DL (ref 8–23)
BUN/CREAT SERPL: 15.2 (ref 7–25)
CALCIUM SPEC-SCNC: 8.8 MG/DL (ref 8.6–10.5)
CHLORIDE SERPL-SCNC: 107 MMOL/L (ref 98–107)
CO2 SERPL-SCNC: 24 MMOL/L (ref 22–29)
CREAT SERPL-MCNC: 0.79 MG/DL (ref 0.57–1)
DEPRECATED RDW RBC AUTO: 45.2 FL (ref 37–54)
EGFRCR SERPLBLD CKD-EPI 2021: 79.1 ML/MIN/1.73
EOSINOPHIL # BLD AUTO: 0.38 10*3/MM3 (ref 0–0.4)
EOSINOPHIL NFR BLD AUTO: 4.4 % (ref 0.3–6.2)
ERYTHROCYTE [DISTWIDTH] IN BLOOD BY AUTOMATED COUNT: 14.1 % (ref 12.3–15.4)
GLOBULIN UR ELPH-MCNC: 2.5 GM/DL
GLUCOSE SERPL-MCNC: 95 MG/DL (ref 65–99)
HCT VFR BLD AUTO: 42 % (ref 34–46.6)
HGB BLD-MCNC: 13.3 G/DL (ref 12–15.9)
IMM GRANULOCYTES # BLD AUTO: 0.03 10*3/MM3 (ref 0–0.05)
IMM GRANULOCYTES NFR BLD AUTO: 0.4 % (ref 0–0.5)
LYMPHOCYTES # BLD AUTO: 2.65 10*3/MM3 (ref 0.7–3.1)
LYMPHOCYTES NFR BLD AUTO: 31 % (ref 19.6–45.3)
MCH RBC QN AUTO: 27.4 PG (ref 26.6–33)
MCHC RBC AUTO-ENTMCNC: 31.7 G/DL (ref 31.5–35.7)
MCV RBC AUTO: 86.6 FL (ref 79–97)
MONOCYTES # BLD AUTO: 0.72 10*3/MM3 (ref 0.1–0.9)
MONOCYTES NFR BLD AUTO: 8.4 % (ref 5–12)
NEUTROPHILS NFR BLD AUTO: 4.75 10*3/MM3 (ref 1.7–7)
NEUTROPHILS NFR BLD AUTO: 55.4 % (ref 42.7–76)
PLATELET # BLD AUTO: 269 10*3/MM3 (ref 140–450)
PMV BLD AUTO: 10.2 FL (ref 6–12)
POTASSIUM SERPL-SCNC: 4.2 MMOL/L (ref 3.5–5.2)
PROT SERPL-MCNC: 6.6 G/DL (ref 6–8.5)
RBC # BLD AUTO: 4.85 10*6/MM3 (ref 3.77–5.28)
SODIUM SERPL-SCNC: 141 MMOL/L (ref 136–145)
WBC NRBC COR # BLD AUTO: 8.56 10*3/MM3 (ref 3.4–10.8)

## 2024-02-06 PROCEDURE — 3077F SYST BP >= 140 MM HG: CPT | Performed by: INTERNAL MEDICINE

## 2024-02-06 PROCEDURE — 83497 ASSAY OF 5-HIAA: CPT

## 2024-02-06 PROCEDURE — 1126F AMNT PAIN NOTED NONE PRSNT: CPT | Performed by: INTERNAL MEDICINE

## 2024-02-06 PROCEDURE — 86316 IMMUNOASSAY TUMOR OTHER: CPT

## 2024-02-06 PROCEDURE — 99214 OFFICE O/P EST MOD 30 MIN: CPT | Performed by: INTERNAL MEDICINE

## 2024-02-06 PROCEDURE — 85025 COMPLETE CBC W/AUTO DIFF WBC: CPT

## 2024-02-06 PROCEDURE — 3078F DIAST BP <80 MM HG: CPT | Performed by: INTERNAL MEDICINE

## 2024-02-06 PROCEDURE — 80053 COMPREHEN METABOLIC PANEL: CPT

## 2024-02-06 PROCEDURE — 36415 COLL VENOUS BLD VENIPUNCTURE: CPT

## 2024-02-06 RX ORDER — METHOCARBAMOL 750 MG/1
750 TABLET, FILM COATED ORAL
COMMUNITY
Start: 2023-11-13

## 2024-02-06 RX ORDER — HYDROCHLOROTHIAZIDE 25 MG/1
1 TABLET ORAL DAILY
COMMUNITY
Start: 2023-11-13

## 2024-02-06 NOTE — PROGRESS NOTES
Follow Up Office Visit      Date: 2024     Patient Name: Madai Cheney  MRN: [id number]  : 1950  Referring Physician: Louisa Deng     Chief Complaint:  Follow-up for history of carcinoid of the ileum     History of Present Illness: Madai Cheney is a pleasant 70 y.o. female past medical history of ileal carcinoid tumor status post resection in , hypertension and anxiety who presents today for evaluation of her history of carcinoid tumor. The patient was initially diagnosed in .  She had a resection in 2001 one of her ileum which was positive for carcinoid.  2 lymph nodes were positive for disease.  She was followed by Dr. Cortez did not require any adjuvant treatment.  She had been followed with 5-HIAA which was last checked in 2016 and was normal.  She states over the last several weeks she has had worsening flushing episodes, hot flashes, and has had 2 episodes of passing out.  Has noticed some mild increase in diarrhea as well as pruritus.  She denies any unexplained fevers, night sweats, vision changes.     Interval History:   Presents to clinic for follow-up.  Overall stable.  Has had intermittent hypertension which is not well-controlled today.  Cardiac workup negative.  Recently had a PET/CT for lung nodules which appeared resolved.  Denies any worsening flushing, nausea, vomiting, diarrhea    Oncology History:    Oncology/Hematology History    No history exists.       Subjective      Review of Systems:   Constitutional: Negative for fevers, chills, or weight loss  Eyes: Negative for blurred vision or discharge         Ear/Nose/Throat: Negative for difficulty swallowing, sore throat, LAD                                                       Respiratory: Negative for cough, SOA, wheezing                                                                                        Cardiovascular: Negative for chest pain or palpitations                                                                   Gastrointestinal: Negative for nausea, vomiting or diarrhea                                                                     Genitourinary: Negative for dysuria or hematuria                                                                                           Musculoskeletal: Negative for any joint pains or muscle aches                                                                        Neurologic: Negative for any weakness, headaches, dizziness                                                                         Hematologic: Negative for any easy bleeding or bruising                                                                                   Psychiatric: Negative for anxiety or depression                          Past Medical History/Past Surgical History/ Family History/ Social History: Reviewed by me and unchanged from my previous documentation done on March 2021.     Medications:     Current Outpatient Medications:     carvedilol (COREG) 12.5 MG tablet, Take 1 tablet by mouth 2 (Two) Times a Day With Meals., Disp: , Rfl:     estradiol (MINIVELLE, VIVELLE-DOT) 0.1 MG/24HR patch, Place 1 patch on the skin as directed by provider 2 (Two) Times a Week., Disp: , Rfl:     hydroCHLOROthiazide (HYDRODIURIL) 25 MG tablet, Take 1 tablet by mouth Daily., Disp: , Rfl:     lisinopril (PRINIVIL,ZESTRIL) 40 MG tablet, Take 1 tablet by mouth Daily., Disp: , Rfl:     methocarbamol (ROBAXIN) 750 MG tablet, Take 1 tablet by mouth every night at bedtime., Disp: , Rfl:     NIFEdipine XL (Procardia XL) 30 MG 24 hr tablet, Take 1 tablet by mouth Daily. STOP amlodipine, Disp: 90 tablet, Rfl: 3    sertraline (ZOLOFT) 50 MG tablet, Take 1 tablet by mouth Daily., Disp: , Rfl:     Vitamin D, Cholecalciferol, 25 MCG (1000 UT) capsule, Take 1 capsule by mouth Daily., Disp: , Rfl:     Allergies:   Allergies   Allergen Reactions    Hydralazine Other (See Comments)     Pt got this medication in the ER for  "hypertension, had an event where her HR dropped to the 40's and became very hypotensive.    Norco [Hydrocodone-Acetaminophen] Other (See Comments)     \" I pass out\"     Other Palpitations and Other (See Comments)     Novacaine, \" I pass out\"     Oxycodone Other (See Comments)     \" I pass out\"     Zolpidem Hives and Other (See Comments)    Codeine Other (See Comments)     Bradycardia, hypotension    Procaine Palpitations    Zolpidem Tartrate Rash       Objective     Physical Exam:  Vital Signs:   Vitals:    02/06/24 1009   BP: 149/74   Pulse: 70   Resp: 18   Temp: 98.5 °F (36.9 °C)   SpO2: 97%   Weight: 89.4 kg (197 lb)   Height: 162.6 cm (64\")   PainSc: 0-No pain  Comment: No new pain     Pain Score    02/06/24 1009   PainSc: 0-No pain  Comment: No new pain     ECOG Performance Status: 0 - Asymptomatic    Constitutional: NAD, ECOG 0  Eyes: PERRLA, scleral anicteric  ENT: No LAD, no thyromegaly  Respiratory: CTAB, no wheezing, rales, rhonchi  Cardiovascular: RRR, no murmurs, pulses 2+ bilaterally  Abdomen: soft, NT/ND, no HSM  Musculoskeletal: strength 5/5 bilaterally, no c/c/e  Neurologic: A&O x 3, CN II-XII intact grossly    Results Review:   Lab on 02/06/2024   Component Date Value Ref Range Status    WBC 02/06/2024 8.56  3.40 - 10.80 10*3/mm3 Final    RBC 02/06/2024 4.85  3.77 - 5.28 10*6/mm3 Final    Hemoglobin 02/06/2024 13.3  12.0 - 15.9 g/dL Final    Hematocrit 02/06/2024 42.0  34.0 - 46.6 % Final    MCV 02/06/2024 86.6  79.0 - 97.0 fL Final    MCH 02/06/2024 27.4  26.6 - 33.0 pg Final    MCHC 02/06/2024 31.7  31.5 - 35.7 g/dL Final    RDW 02/06/2024 14.1  12.3 - 15.4 % Final    RDW-SD 02/06/2024 45.2  37.0 - 54.0 fl Final    MPV 02/06/2024 10.2  6.0 - 12.0 fL Final    Platelets 02/06/2024 269  140 - 450 10*3/mm3 Final    Neutrophil % 02/06/2024 55.4  42.7 - 76.0 % Final    Lymphocyte % 02/06/2024 31.0  19.6 - 45.3 % Final    Monocyte % 02/06/2024 8.4  5.0 - 12.0 % Final    Eosinophil % 02/06/2024 4.4  0.3 - " 6.2 % Final    Basophil % 02/06/2024 0.4  0.0 - 1.5 % Final    Immature Grans % 02/06/2024 0.4  0.0 - 0.5 % Final    Neutrophils, Absolute 02/06/2024 4.75  1.70 - 7.00 10*3/mm3 Final    Lymphocytes, Absolute 02/06/2024 2.65  0.70 - 3.10 10*3/mm3 Final    Monocytes, Absolute 02/06/2024 0.72  0.10 - 0.90 10*3/mm3 Final    Eosinophils, Absolute 02/06/2024 0.38  0.00 - 0.40 10*3/mm3 Final    Basophils, Absolute 02/06/2024 0.03  0.00 - 0.20 10*3/mm3 Final    Immature Grans, Absolute 02/06/2024 0.03  0.00 - 0.05 10*3/mm3 Final       No results found.    Assessment / Plan      Assessment/Plan:   1. 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  Did not require any adjuvant therapy.  Unfortunately pathology is not available  -Currently asymptomatic  -Repeat 5-HIAA and chromogranin A within normal limits  -Has been lost to follow-up since March 2021  -Will repeat 5-HIAA and chromogranin A today  -Recent PET/CT without evidence of disease in November 2023     2. Fibrocystic breast changes, unspecified laterality  -Noted during her previous mammogram and biopsy several years ago  -Breast ultrasound in October 2023 with no concerning lesions         Follow Up:   Follow-up in 1 year     Ariel Reyes MD  Hematology and Oncology     Please note that portions of this note may have been completed with a voice recognition program. Efforts were made to edit the dictations, but occasionally words are mistranscribed.

## 2024-02-08 LAB — CGA SERPL-MCNC: 53.6 NG/ML (ref 0–101.8)

## 2024-02-22 LAB — 5OH-INDOLEACETATE SERPL-MCNC: 30 NG/ML

## 2024-02-29 ENCOUNTER — TELEPHONE (OUTPATIENT)
Dept: CARDIOLOGY | Facility: CLINIC | Age: 74
End: 2024-02-29
Payer: MEDICARE

## 2024-02-29 ENCOUNTER — TELEPHONE (OUTPATIENT)
Dept: ONCOLOGY | Facility: CLINIC | Age: 74
End: 2024-02-29
Payer: MEDICARE

## 2024-02-29 NOTE — TELEPHONE ENCOUNTER
Received fax from Dr. Montgomery for cardiac clearance for patient to have an excision of back mass. According to our records, I do not see where patient is on any blood thinners or has had any stenting.          Fax [phone]

## 2024-02-29 NOTE — TELEPHONE ENCOUNTER
Caller: Madai Cheney    Relationship: Self    Best call back number: [phone]    Caller requesting test results: PT    What test was performed: LABS    When was the test performed: 2-6-2024    Where was the test performed: [facility]

## 2024-03-01 NOTE — TELEPHONE ENCOUNTER
Left message to notify Madai that Dr Reyes reviewed lab results.  Would like Madai to monitor blood pressure and if it continues to be elevated to call for a sooner appointment.  Otherwise, keep appointment in one year and we will continue to monitor lab results and scans.

## 2024-05-16 ENCOUNTER — OFFICE VISIT (OUTPATIENT)
Dept: CARDIOLOGY | Facility: CLINIC | Age: 74
End: 2024-05-16
Payer: MEDICARE

## 2024-05-16 VITALS
HEART RATE: 64 BPM | DIASTOLIC BLOOD PRESSURE: 74 MMHG | BODY MASS INDEX: 33.36 KG/M2 | SYSTOLIC BLOOD PRESSURE: 130 MMHG | HEIGHT: 64 IN | WEIGHT: 195.4 LBS

## 2024-05-16 DIAGNOSIS — G47.34 NOCTURNAL OXYGEN DESATURATION: ICD-10-CM

## 2024-05-16 DIAGNOSIS — I10 PRIMARY HYPERTENSION: ICD-10-CM

## 2024-05-16 DIAGNOSIS — G47.19 EXCESSIVE DAYTIME SLEEPINESS: Primary | ICD-10-CM

## 2024-05-16 DIAGNOSIS — I25.6 SILENT MYOCARDIAL ISCHEMIA: ICD-10-CM

## 2024-05-16 RX ORDER — NIFEDIPINE 30 MG/1
30 TABLET, EXTENDED RELEASE ORAL DAILY
Qty: 90 TABLET | Refills: 3 | Status: SHIPPED | OUTPATIENT
Start: 2024-05-16
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
## 2024-05-22 RX ORDER — FAMOTIDINE 20 MG
1 TABLET ORAL DAILY
Qty: 60 CAPSULE | Refills: 11 | Status: SHIPPED | OUTPATIENT
Start: 2024-05-22

## 2024-11-21 ENCOUNTER — OFFICE VISIT (OUTPATIENT)
Dept: CARDIOLOGY | Facility: CLINIC | Age: 74
End: 2024-11-21
Payer: MEDICARE

## 2024-11-21 VITALS
HEART RATE: 73 BPM | SYSTOLIC BLOOD PRESSURE: 120 MMHG | BODY MASS INDEX: 32.4 KG/M2 | WEIGHT: 189.8 LBS | DIASTOLIC BLOOD PRESSURE: 80 MMHG | HEIGHT: 64 IN

## 2024-11-21 DIAGNOSIS — I10 PRIMARY HYPERTENSION: Primary | ICD-10-CM

## 2024-11-21 DIAGNOSIS — R42 DIZZINESS: ICD-10-CM

## 2024-11-21 DIAGNOSIS — I73.9 CLAUDICATION: ICD-10-CM

## 2024-11-21 DIAGNOSIS — R01.1 HEART MURMUR: ICD-10-CM

## 2024-11-21 PROCEDURE — 3074F SYST BP LT 130 MM HG: CPT | Performed by: NURSE PRACTITIONER

## 2024-11-21 PROCEDURE — 99214 OFFICE O/P EST MOD 30 MIN: CPT | Performed by: NURSE PRACTITIONER

## 2024-11-21 PROCEDURE — 3079F DIAST BP 80-89 MM HG: CPT | Performed by: NURSE PRACTITIONER

## 2024-11-21 RX ORDER — NIFEDIPINE 30 MG/1
30 TABLET, EXTENDED RELEASE ORAL DAILY
Qty: 90 TABLET | Refills: 3 | Status: SHIPPED | OUTPATIENT
Start: 2024-11-21

## 2024-11-21 RX ORDER — CARVEDILOL 12.5 MG/1
12.5 TABLET ORAL 2 TIMES DAILY WITH MEALS
Qty: 180 TABLET | Refills: 3 | Status: SHIPPED | OUTPATIENT
Start: 2024-11-21

## 2024-11-21 RX ORDER — CLARITHROMYCIN 500 MG/1
500 TABLET ORAL 2 TIMES DAILY
COMMUNITY

## 2024-11-21 RX ORDER — AMOXICILLIN 500 MG/1
1000 CAPSULE ORAL 2 TIMES DAILY
COMMUNITY
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
## 2024-11-26 ENCOUNTER — HOSPITAL ENCOUNTER (OUTPATIENT)
Dept: CARDIOLOGY | Facility: HOSPITAL | Age: 74
Discharge: HOME | End: 2024-11-26
Admitting: NURSE PRACTITIONER
Payer: MEDICARE

## 2024-11-26 DIAGNOSIS — R42 DIZZINESS: ICD-10-CM

## 2024-11-26 PROCEDURE — 93880 EXTRACRANIAL BILAT STUDY: CPT

## 2024-11-26 PROCEDURE — 93880 EXTRACRANIAL BILAT STUDY: CPT | Performed by: RADIOLOGY

## 2024-12-02 ENCOUNTER — TELEPHONE (OUTPATIENT)
Dept: CARDIOLOGY | Facility: CLINIC | Age: 74
End: 2024-12-02
Payer: MEDICARE

## 2024-12-02 DIAGNOSIS — I73.9 CLAUDICATION: Primary | ICD-10-CM

## 2024-12-02 NOTE — TELEPHONE ENCOUNTER
While speaking with patient concerning results. She reports having swelling in legs again the evening testing. She has concerns with swelling that comes and goes in legs. Reports she is not eating any more sodium than normal, she feels related to veins in legs.

## 2024-12-03 ENCOUNTER — TELEPHONE (OUTPATIENT)
Dept: CARDIOLOGY | Facility: CLINIC | Age: 74
End: 2024-12-03
Payer: MEDICARE

## 2024-12-03 NOTE — TELEPHONE ENCOUNTER
Hub staff attempted to follow warm transfer process and was unsuccessful     Caller: Madai Cheney    Relationship to patient: Self    Best call back number: [phone]     Patient is needing: RETURNING A CALL TO [facility] ABOUT KARMA RESULTS AND RECOMMENDATIONS

## 2024-12-10 ENCOUNTER — HOSPITAL ENCOUNTER (OUTPATIENT)
Dept: CARDIOLOGY | Facility: HOSPITAL | Age: 74
Discharge: HOME | End: 2024-12-10
Admitting: NURSE PRACTITIONER
Payer: MEDICARE

## 2024-12-10 DIAGNOSIS — I73.9 CLAUDICATION: ICD-10-CM

## 2024-12-10 PROCEDURE — 93923 UPR/LXTR ART STDY 3+ LVLS: CPT

## 2024-12-11 ENCOUNTER — TELEPHONE (OUTPATIENT)
Dept: CARDIOLOGY | Facility: CLINIC | Age: 74
End: 2024-12-11
Payer: MEDICARE

## 2024-12-11 NOTE — TELEPHONE ENCOUNTER
Caller: Madai Cheney    Relationship: Self    Best call back number: [phone]    What is the best time to reach you: ANY    Who are you requesting to speak with (clinical staff, provider,  specific staff member): YESENIA    Do you know the name of the person who called: YESENIA    What was the call regarding: PATIENT CALLED BACK RETURNING A MISSED CALL FROM [facility]. NO NOTE IN CHART OR REFERRAL COMMUNICATION TO CALL BACK. PT BELIEVES IT COULD BE RESULTS REGARDING A RECENT TEST. PLEASE CALL HER BACK WHEN POSSIBLE. THANK YOU    Is it okay if the provider responds through [employer]: PLEASE CALL

## 2025-02-04 ENCOUNTER — LAB (OUTPATIENT)
Dept: LAB | Facility: HOSPITAL | Age: 75
End: 2025-02-04
Payer: MEDICARE

## 2025-02-04 ENCOUNTER — OFFICE VISIT (OUTPATIENT)
Dept: ONCOLOGY | Facility: CLINIC | Age: 75
End: 2025-02-04
Payer: MEDICARE

## 2025-02-04 VITALS
HEART RATE: 65 BPM | RESPIRATION RATE: 18 BRPM | SYSTOLIC BLOOD PRESSURE: 131 MMHG | HEIGHT: 64 IN | WEIGHT: 193.2 LBS | TEMPERATURE: 97.5 F | DIASTOLIC BLOOD PRESSURE: 76 MMHG | BODY MASS INDEX: 32.98 KG/M2 | OXYGEN SATURATION: 97 %

## 2025-02-04 DIAGNOSIS — Z85.060 PERSONAL HISTORY OF MALIGNANT CARCINOID TUMOR OF SMALL INTESTINE: ICD-10-CM

## 2025-02-04 DIAGNOSIS — Z85.060 PERSONAL HISTORY OF MALIGNANT CARCINOID TUMOR OF SMALL INTESTINE: Primary | ICD-10-CM

## 2025-02-04 DIAGNOSIS — C7A.8 OTHER MALIGNANT NEUROENDOCRINE TUMORS: ICD-10-CM

## 2025-02-04 LAB
ALBUMIN SERPL-MCNC: 4.1 G/DL (ref 3.5–5.2)
ALBUMIN/GLOB SERPL: 1.7 G/DL
ALP SERPL-CCNC: 67 U/L (ref 39–117)
ALT SERPL W P-5'-P-CCNC: 9 U/L (ref 1–33)
ANION GAP SERPL CALCULATED.3IONS-SCNC: 10 MMOL/L (ref 5–15)
AST SERPL-CCNC: 13 U/L (ref 1–32)
BASOPHILS # BLD AUTO: 0.03 10*3/MM3 (ref 0–0.2)
BASOPHILS NFR BLD AUTO: 0.4 % (ref 0–1.5)
BILIRUB SERPL-MCNC: 0.2 MG/DL (ref 0–1.2)
BUN SERPL-MCNC: 17 MG/DL (ref 8–23)
BUN/CREAT SERPL: 22.4 (ref 7–25)
CALCIUM SPEC-SCNC: 9.4 MG/DL (ref 8.6–10.5)
CHLORIDE SERPL-SCNC: 105 MMOL/L (ref 98–107)
CO2 SERPL-SCNC: 25 MMOL/L (ref 22–29)
CREAT SERPL-MCNC: 0.76 MG/DL (ref 0.57–1)
DEPRECATED RDW RBC AUTO: 45.4 FL (ref 37–54)
EGFRCR SERPLBLD CKD-EPI 2021: 82.3 ML/MIN/1.73
EOSINOPHIL # BLD AUTO: 0.43 10*3/MM3 (ref 0–0.4)
EOSINOPHIL NFR BLD AUTO: 5.1 % (ref 0.3–6.2)
ERYTHROCYTE [DISTWIDTH] IN BLOOD BY AUTOMATED COUNT: 14.3 % (ref 12.3–15.4)
GLOBULIN UR ELPH-MCNC: 2.4 GM/DL
GLUCOSE SERPL-MCNC: 110 MG/DL (ref 65–99)
HCT VFR BLD AUTO: 39.6 % (ref 34–46.6)
HGB BLD-MCNC: 12.9 G/DL (ref 12–15.9)
IMM GRANULOCYTES # BLD AUTO: 0.02 10*3/MM3 (ref 0–0.05)
IMM GRANULOCYTES NFR BLD AUTO: 0.2 % (ref 0–0.5)
LYMPHOCYTES # BLD AUTO: 2.81 10*3/MM3 (ref 0.7–3.1)
LYMPHOCYTES NFR BLD AUTO: 33.2 % (ref 19.6–45.3)
MCH RBC QN AUTO: 27.9 PG (ref 26.6–33)
MCHC RBC AUTO-ENTMCNC: 32.6 G/DL (ref 31.5–35.7)
MCV RBC AUTO: 85.5 FL (ref 79–97)
MONOCYTES # BLD AUTO: 0.69 10*3/MM3 (ref 0.1–0.9)
MONOCYTES NFR BLD AUTO: 8.1 % (ref 5–12)
NEUTROPHILS NFR BLD AUTO: 4.49 10*3/MM3 (ref 1.7–7)
NEUTROPHILS NFR BLD AUTO: 53 % (ref 42.7–76)
PLATELET # BLD AUTO: 275 10*3/MM3 (ref 140–450)
PMV BLD AUTO: 10.7 FL (ref 6–12)
POTASSIUM SERPL-SCNC: 4.5 MMOL/L (ref 3.5–5.2)
PROT SERPL-MCNC: 6.5 G/DL (ref 6–8.5)
RBC # BLD AUTO: 4.63 10*6/MM3 (ref 3.77–5.28)
SODIUM SERPL-SCNC: 140 MMOL/L (ref 136–145)
WBC NRBC COR # BLD AUTO: 8.47 10*3/MM3 (ref 3.4–10.8)

## 2025-02-04 PROCEDURE — 85025 COMPLETE CBC W/AUTO DIFF WBC: CPT

## 2025-02-04 PROCEDURE — 36415 COLL VENOUS BLD VENIPUNCTURE: CPT

## 2025-02-04 PROCEDURE — 86316 IMMUNOASSAY TUMOR OTHER: CPT

## 2025-02-04 PROCEDURE — 83497 ASSAY OF 5-HIAA: CPT

## 2025-02-04 PROCEDURE — 80053 COMPREHEN METABOLIC PANEL: CPT

## 2025-02-04 RX ORDER — ASPIRIN 81 MG/1
1 TABLET ORAL DAILY
COMMUNITY

## 2025-02-04 NOTE — PROGRESS NOTES
Follow Up Office Visit      Date: 2025     Patient Name: Madai Cheney  MRN: [id number]  : 1950  Referring Physician: Louisa Deng     Chief Complaint:  Follow-up for history of carcinoid of the ileum     History of Present Illness: Madai Cheney is a pleasant 70 y.o. female past medical history of ileal carcinoid tumor status post resection in , hypertension and anxiety who presents today for evaluation of her history of carcinoid tumor. The patient was initially diagnosed in .  She had a resection in 2001 one of her ileum which was positive for carcinoid.  2 lymph nodes were positive for disease.  She was followed by Dr. Cortez did not require any adjuvant treatment.  She had been followed with 5-HIAA which was last checked in 2016 and was normal.  She states over the last several weeks she has had worsening flushing episodes, hot flashes, and has had 2 episodes of passing out.  Has noticed some mild increase in diarrhea as well as pruritus.  She denies any unexplained fevers, night sweats, vision changes.     Interval History:   Presents to clinic for follow-up.  Worsening dizziness in the fall .  Workup notable for a left supraclavicular mass on ultrasound.  This has not been worked up further.  She has noted a couple skin lesions on her back and right arm as well.    Oncology History:    Oncology/Hematology History    No history exists.       Subjective      Review of Systems:   Constitutional: Negative for fevers, chills, or weight loss  Eyes: Negative for blurred vision or discharge         Ear/Nose/Throat: Negative for difficulty swallowing, sore throat, LAD                                                       Respiratory: Negative for cough, SOA, wheezing                                                                                        Cardiovascular: Negative for chest pain or palpitations                                                                   Gastrointestinal: Negative for nausea, vomiting or diarrhea                                                                     Genitourinary: Negative for dysuria or hematuria                                                                                           Musculoskeletal: Negative for any joint pains or muscle aches                                                                        Neurologic: Negative for any weakness, headaches, dizziness                                                                         Hematologic: Negative for any easy bleeding or bruising                                                                                   Psychiatric: Negative for anxiety or depression                          Past Medical History/Past Surgical History/ Family History/ Social History: Reviewed by me and unchanged from my previous documentation done on February 2024.     Medications:     Current Outpatient Medications:     aspirin 81 MG EC tablet, Take 1 tablet by mouth Daily., Disp: , Rfl:     carvedilol (COREG) 12.5 MG tablet, Take 1 tablet by mouth 2 (Two) Times a Day With Meals., Disp: 180 tablet, Rfl: 3    estradiol (MINIVELLE, VIVELLE-DOT) 0.1 MG/24HR patch, Place 1 patch on the skin as directed by provider 2 (Two) Times a Week., Disp: , Rfl:     lisinopril (PRINIVIL,ZESTRIL) 40 MG tablet, Take 1 tablet by mouth Daily., Disp: , Rfl:     NIFEdipine XL (Procardia XL) 30 MG 24 hr tablet, Take 1 tablet by mouth Daily. STOP amlodipine, Disp: 90 tablet, Rfl: 3    sertraline (ZOLOFT) 50 MG tablet, Take 1 tablet by mouth Daily., Disp: , Rfl:     Vitamin D, Cholecalciferol, 25 MCG (1000 UT) capsule, Take 1 capsule by mouth Daily., Disp: 60 capsule, Rfl: 11    amoxicillin (AMOXIL) 500 MG capsule, Take 2 capsules by mouth 2 (Two) Times a Day. (Patient not taking: Reported on 2/4/2025), Disp: , Rfl:     clarithromycin (BIAXIN) 500 MG tablet, Take 1 tablet by mouth 2 (Two) Times a Day. (Patient not  "taking: Reported on 2/4/2025), Disp: , Rfl:     Allergies:   Allergies   Allergen Reactions    Hydralazine Other (See Comments) and Unknown - Low Severity     Pt got this medication in the ER for hypertension, had an event where her HR dropped to the 40's and became very hypotensive.    Norco [Hydrocodone-Acetaminophen] Other (See Comments)     \" I pass out\"     Other Other (See Comments), Palpitations and Anaphylaxis     Novacaine, \" I pass out\"    Novacaine    Oxycodone Other (See Comments) and Unknown - Low Severity     \" I pass out\"    Bradycardia, hypotension    Procaine Palpitations, Anaphylaxis, Arrhythmia and Unknown - Low Severity    Zolpidem Hives, Other (See Comments), Itching and Rash     Ambien    Hydrocodone Unknown - Low Severity    Lidocaine Unknown - Low Severity    Nickel Irritability    Ceftriaxone Hives and Rash    Codeine Other (See Comments) and Unknown - Low Severity     Bradycardia, hypotension    Hydrocodone-Acetaminophen Other (See Comments)     Bradycardia, hypotension    Zolpidem Tartrate Rash       Objective     Physical Exam:  Vital Signs:   Vitals:    02/04/25 1100   BP: 131/76   Pulse: 65   Resp: 18   Temp: 97.5 °F (36.4 °C)   TempSrc: Temporal   SpO2: 97%   Weight: 87.6 kg (193 lb 3.2 oz)   Height: 162.6 cm (64.02\")   PainSc: 0-No pain     Pain Score    02/04/25 1100   PainSc: 0-No pain     ECOG Performance Status: 1 - Symptomatic but completely ambulatory    Constitutional: NAD, ECOG 1  Eyes: PERRLA, scleral anicteric  ENT: No LAD, no thyromegaly  Respiratory: CTAB, no wheezing, rales, rhonchi  Cardiovascular: RRR, no murmurs, pulses 2+ bilaterally  Abdomen: soft, NT/ND, no HSM  Musculoskeletal: strength 5/5 bilaterally, no c/c/e  Neurologic: A&O x 3, CN II-XII intact grossly    Results Review:   No visits with results within 2 Week(s) from this visit.   Latest known visit with results is:   Lab on 02/06/2024   Component Date Value Ref Range Status    5-HIAA, PLASMA 02/06/2024 30 " (H)  ng/mL Final    This 5-HIAA result was determined using liquid  chromatography-tandem mass spectrometry (LC-MS/MS).  Values obtained cannot be evaluated interchangeably with  different assay methods or kits.  This 5-HIAA result alone cannot be interpreted as absolute  evidence of the presence or absence of disease.  The performance characteristics of this assay have been  determined by [employer]. The result should not be used as a  diagnostic procedure without confirmation of the diagnosis  by another medically established diagnostic product or  procedure.  Reference Range:  <22    CHROMOGRANIN A 02/06/2024 53.6  0.0 - 101.8 ng/mL Final    Chromogranin A performed by [employer] KRYPTOR methodology  Values obtained with different assay methods or kits cannot be used  interchangeably.    Glucose 02/06/2024 95  65 - 99 mg/dL Final    BUN 02/06/2024 12  8 - 23 mg/dL Final    Creatinine 02/06/2024 0.79  0.57 - 1.00 mg/dL Final    Sodium 02/06/2024 141  136 - 145 mmol/L Final    Potassium 02/06/2024 4.2  3.5 - 5.2 mmol/L Final    Slight hemolysis detected by analyzer. Result may be falsely elevated.    Chloride 02/06/2024 107  98 - 107 mmol/L Final    CO2 02/06/2024 24.0  22.0 - 29.0 mmol/L Final    Calcium 02/06/2024 8.8  8.6 - 10.5 mg/dL Final    Total Protein 02/06/2024 6.6  6.0 - 8.5 g/dL Final    Albumin 02/06/2024 4.1  3.5 - 5.2 g/dL Final    ALT (SGPT) 02/06/2024 10  1 - 33 U/L Final    AST (SGOT) 02/06/2024 12  1 - 32 U/L Final    Alkaline Phosphatase 02/06/2024 67  39 - 117 U/L Final    Total Bilirubin 02/06/2024 0.3  0.0 - 1.2 mg/dL Final    Globulin 02/06/2024 2.5  gm/dL Final    Calculated Result    A/G Ratio 02/06/2024 1.6  g/dL Final    BUN/Creatinine Ratio 02/06/2024 15.2  7.0 - 25.0 Final    Anion Gap 02/06/2024 10.0  5.0 - 15.0 mmol/L Final    eGFR 02/06/2024 79.1  >60.0 mL/min/1.73 Final    WBC 02/06/2024 8.56  3.40 - 10.80 10*3/mm3 Final    RBC 02/06/2024 4.85  3.77 - 5.28 10*6/mm3 Final     Hemoglobin 02/06/2024 13.3  12.0 - 15.9 g/dL Final    Hematocrit 02/06/2024 42.0  34.0 - 46.6 % Final    MCV 02/06/2024 86.6  79.0 - 97.0 fL Final    MCH 02/06/2024 27.4  26.6 - 33.0 pg Final    MCHC 02/06/2024 31.7  31.5 - 35.7 g/dL Final    RDW 02/06/2024 14.1  12.3 - 15.4 % Final    RDW-SD 02/06/2024 45.2  37.0 - 54.0 fl Final    MPV 02/06/2024 10.2  6.0 - 12.0 fL Final    Platelets 02/06/2024 269  140 - 450 10*3/mm3 Final    Neutrophil % 02/06/2024 55.4  42.7 - 76.0 % Final    Lymphocyte % 02/06/2024 31.0  19.6 - 45.3 % Final    Monocyte % 02/06/2024 8.4  5.0 - 12.0 % Final    Eosinophil % 02/06/2024 4.4  0.3 - 6.2 % Final    Basophil % 02/06/2024 0.4  0.0 - 1.5 % Final    Immature Grans % 02/06/2024 0.4  0.0 - 0.5 % Final    Neutrophils, Absolute 02/06/2024 4.75  1.70 - 7.00 10*3/mm3 Final    Lymphocytes, Absolute 02/06/2024 2.65  0.70 - 3.10 10*3/mm3 Final    Monocytes, Absolute 02/06/2024 0.72  0.10 - 0.90 10*3/mm3 Final    Eosinophils, Absolute 02/06/2024 0.38  0.00 - 0.40 10*3/mm3 Final    Basophils, Absolute 02/06/2024 0.03  0.00 - 0.20 10*3/mm3 Final    Immature Grans, Absolute 02/06/2024 0.03  0.00 - 0.05 10*3/mm3 Final       No results found.    Assessment / Plan      Assessment/Plan:   1. 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  Did not require any adjuvant therapy.  Unfortunately pathology is not available  -Repeat 5-HIAA and chromogranin A within normal limits in 2021  -PET/CT without evidence of disease in November 2023  -5-HIAA and chromogranin A overall stable in February 2024  -Plan to repeat labs today  -Given her left supraclavicular mass noted on recent ultrasound and new skin lesions, will check CT neck, chest, abdomen/pelvis     2. Fibrocystic breast changes, unspecified laterality  -Noted during her previous mammogram and biopsy several years ago  -Breast ultrasound in October 2023 with no concerning lesions         Follow  Up:   Follow-up after CT scans     Ariel Reyes MD  Hematology and Oncology     Please note that portions of this note may have been completed with a voice recognition program. Efforts were made to edit the dictations, but occasionally words are mistranscribed.

## 2025-02-06 LAB — CGA SERPL-MCNC: 75.1 NG/ML (ref 0–101.8)

## 2025-02-07 LAB — 5OH-INDOLEACETATE SERPL-MCNC: 12 NG/ML

## 2025-02-11 ENCOUNTER — HOSPITAL ENCOUNTER (OUTPATIENT)
Dept: CT IMAGING | Facility: HOSPITAL | Age: 75
Discharge: HOME | End: 2025-02-11
Admitting: INTERNAL MEDICINE
Payer: MEDICARE

## 2025-02-11 DIAGNOSIS — Z85.060 PERSONAL HISTORY OF MALIGNANT CARCINOID TUMOR OF SMALL INTESTINE: ICD-10-CM

## 2025-02-11 PROCEDURE — 71260 CT THORAX DX C+: CPT

## 2025-02-11 PROCEDURE — 70491 CT SOFT TISSUE NECK W/DYE: CPT

## 2025-02-11 PROCEDURE — 74177 CT ABD & PELVIS W/CONTRAST: CPT

## 2025-02-11 PROCEDURE — 25510000001 IOPAMIDOL 61 % SOLUTION: Performed by: INTERNAL MEDICINE

## 2025-02-11 RX ORDER — IOPAMIDOL 612 MG/ML
100 INJECTION, SOLUTION INTRAVASCULAR
Status: COMPLETED | OUTPATIENT
Start: 2025-02-11 | End: 2025-02-11

## 2025-02-11 RX ADMIN — IOPAMIDOL 85 ML: 612 INJECTION, SOLUTION INTRAVENOUS at 14:16

## 2025-02-14 ENCOUNTER — OFFICE VISIT (OUTPATIENT)
Dept: ONCOLOGY | Facility: CLINIC | Age: 75
End: 2025-02-14
Payer: MEDICARE

## 2025-02-14 VITALS
RESPIRATION RATE: 16 BRPM | WEIGHT: 194.7 LBS | HEIGHT: 64 IN | OXYGEN SATURATION: 98 % | SYSTOLIC BLOOD PRESSURE: 131 MMHG | BODY MASS INDEX: 33.24 KG/M2 | TEMPERATURE: 97.3 F | DIASTOLIC BLOOD PRESSURE: 74 MMHG | HEART RATE: 63 BPM

## 2025-02-14 DIAGNOSIS — L98.9 BACK SKIN LESION: ICD-10-CM

## 2025-02-14 DIAGNOSIS — Z85.060 PERSONAL HISTORY OF MALIGNANT CARCINOID TUMOR OF SMALL INTESTINE: Primary | ICD-10-CM

## 2025-02-14 NOTE — PROGRESS NOTES
Follow Up Office Visit      Date: 2025     Patient Name: Madai Cheney  MRN: [id number]  : 1950  Referring Physician: Louisa Deng     Chief Complaint:  Follow-up for history of carcinoid of the ileum     History of Present Illness: Madai Cheney is a pleasant 70 y.o. female past medical history of ileal carcinoid tumor status post resection in , hypertension and anxiety who presents today for evaluation of her history of carcinoid tumor. The patient was initially diagnosed in .  She had a resection in 2001 one of her ileum which was positive for carcinoid.  2 lymph nodes were positive for disease.  She was followed by Dr. Cortez did not require any adjuvant treatment.  She had been followed with 5-HIAA which was last checked in 2016 and was normal.  She states over the last several weeks she has had worsening flushing episodes, hot flashes, and has had 2 episodes of passing out.  Has noticed some mild increase in diarrhea as well as pruritus.  She denies any unexplained fevers, night sweats, vision changes.     Interval History:   Presents to clinic for follow-up. No major issues today. Back pain present from her skin lesion. Denies any neck pain or discomfort    Oncology History:    Oncology/Hematology History    No history exists.       Subjective      Review of Systems:   Constitutional: Negative for fevers, chills, or weight loss  Eyes: Negative for blurred vision or discharge         Ear/Nose/Throat: Negative for difficulty swallowing, sore throat, LAD                                                       Respiratory: Negative for cough, SOA, wheezing                                                                                        Cardiovascular: Negative for chest pain or palpitations                                                                  Gastrointestinal: Negative for nausea, vomiting or diarrhea                                                                    "  Genitourinary: Negative for dysuria or hematuria                                                                                           Musculoskeletal: Negative for any joint pains or muscle aches                                                                        Neurologic: Negative for any weakness, headaches, dizziness                                                                         Hematologic: Negative for any easy bleeding or bruising                                                                                   Psychiatric: Negative for anxiety or depression                          Past Medical History/Past Surgical History/ Family History/ Social History: Reviewed by me and unchanged from my previous documentation done on February 2025.     Medications:     Current Outpatient Medications:     carvedilol (COREG) 12.5 MG tablet, Take 1 tablet by mouth 2 (Two) Times a Day With Meals., Disp: 180 tablet, Rfl: 3    estradiol (MINIVELLE, VIVELLE-DOT) 0.1 MG/24HR patch, Place 1 patch on the skin as directed by provider 2 (Two) Times a Week., Disp: , Rfl:     lisinopril (PRINIVIL,ZESTRIL) 40 MG tablet, Take 1 tablet by mouth Daily., Disp: , Rfl:     NIFEdipine XL (Procardia XL) 30 MG 24 hr tablet, Take 1 tablet by mouth Daily. STOP amlodipine, Disp: 90 tablet, Rfl: 3    sertraline (ZOLOFT) 50 MG tablet, Take 1 tablet by mouth Daily., Disp: , Rfl:     Vitamin D, Cholecalciferol, 25 MCG (1000 UT) capsule, Take 1 capsule by mouth Daily., Disp: 60 capsule, Rfl: 11    Allergies:   Allergies   Allergen Reactions    Hydralazine Other (See Comments) and Unknown - Low Severity     Pt got this medication in the ER for hypertension, had an event where her HR dropped to the 40's and became very hypotensive.    Norco [Hydrocodone-Acetaminophen] Other (See Comments)     \" I pass out\"     Other Other (See Comments), Palpitations and Anaphylaxis     Novacaine, \" I pass out\"    Novacaine    Oxycodone Other (See " "Comments) and Unknown - Low Severity     \" I pass out\"    Bradycardia, hypotension    Procaine Palpitations, Anaphylaxis, Arrhythmia and Unknown - Low Severity    Zolpidem Hives, Other (See Comments), Itching and Rash     Ambien    Hydrocodone Unknown - Low Severity    Lidocaine Unknown - Low Severity    Nickel Irritability    Ceftriaxone Hives and Rash    Codeine Other (See Comments) and Unknown - Low Severity     Bradycardia, hypotension    Hydrocodone-Acetaminophen Other (See Comments)     Bradycardia, hypotension    Zolpidem Tartrate Rash       Objective     Physical Exam:  Vital Signs:   Vitals:    02/14/25 1034   BP: 131/74   Pulse: 63   Resp: 16   Temp: 97.3 °F (36.3 °C)   TempSrc: Temporal   SpO2: 98%   Weight: 88.3 kg (194 lb 11.2 oz)   Height: 162.6 cm (64.02\")   PainSc:   6   PainLoc: Comment: lumps on back     Pain Score    02/14/25 1034   PainSc:   6   PainLoc: Comment: lumps on back     ECOG Performance Status: 0 - Asymptomatic    Constitutional: NAD, ECOG 0  Eyes: PERRLA, scleral anicteric  ENT: No LAD, no thyromegaly  Respiratory: CTAB, no wheezing, rales, rhonchi  Cardiovascular: RRR, no murmurs, pulses 2+ bilaterally  Abdomen: soft, NT/ND, no HSM  Musculoskeletal: strength 5/5 bilaterally, no c/c/e  Neurologic: A&O x 3, CN II-XII intact grossly    Results Review:   Lab on 02/04/2025   Component Date Value Ref Range Status    Glucose 02/04/2025 110 (H)  65 - 99 mg/dL Final    BUN 02/04/2025 17  8 - 23 mg/dL Final    Creatinine 02/04/2025 0.76  0.57 - 1.00 mg/dL Final    Sodium 02/04/2025 140  136 - 145 mmol/L Final    Potassium 02/04/2025 4.5  3.5 - 5.2 mmol/L Final    Slight hemolysis detected by analyzer. Result may be falsely elevated.    Chloride 02/04/2025 105  98 - 107 mmol/L Final    CO2 02/04/2025 25.0  22.0 - 29.0 mmol/L Final    Calcium 02/04/2025 9.4  8.6 - 10.5 mg/dL Final    Total Protein 02/04/2025 6.5  6.0 - 8.5 g/dL Final    Albumin 02/04/2025 4.1  3.5 - 5.2 g/dL Final    ALT (SGPT) " 02/04/2025 9  1 - 33 U/L Final    AST (SGOT) 02/04/2025 13  1 - 32 U/L Final    Alkaline Phosphatase 02/04/2025 67  39 - 117 U/L Final    Total Bilirubin 02/04/2025 0.2  0.0 - 1.2 mg/dL Final    Globulin 02/04/2025 2.4  gm/dL Final    Calculated Result    A/G Ratio 02/04/2025 1.7  g/dL Final    BUN/Creatinine Ratio 02/04/2025 22.4  7.0 - 25.0 Final    Anion Gap 02/04/2025 10.0  5.0 - 15.0 mmol/L Final    eGFR 02/04/2025 82.3  >60.0 mL/min/1.73 Final    CHROMOGRANIN A 02/04/2025 75.1  0.0 - 101.8 ng/mL Final    Chromogranin A performed by [employer] KRYPTOR methodology  Values obtained with different assay methods or kits cannot be used  interchangeably.    5-HIAA, PLASMA 02/04/2025 12  ng/mL Final    This 5-HIAA result was determined using liquid  chromatography-tandem mass spectrometry (LC-MS/MS).  Values obtained cannot be evaluated interchangeably with  different assay methods or kits.  This 5-HIAA result alone cannot be interpreted as absolute  evidence of the presence or absence of disease.  The performance characteristics of this assay have been  determined by [email or web address]. The result should not be used as a  diagnostic procedure without confirmation of the diagnosis  by another medically established diagnostic product or  procedure.  Reference Range:  <22    WBC 02/04/2025 8.47  3.40 - 10.80 10*3/mm3 Final    RBC 02/04/2025 4.63  3.77 - 5.28 10*6/mm3 Final    Hemoglobin 02/04/2025 12.9  12.0 - 15.9 g/dL Final    Hematocrit 02/04/2025 39.6  34.0 - 46.6 % Final    MCV 02/04/2025 85.5  79.0 - 97.0 fL Final    MCH 02/04/2025 27.9  26.6 - 33.0 pg Final    MCHC 02/04/2025 32.6  31.5 - 35.7 g/dL Final    RDW 02/04/2025 14.3  12.3 - 15.4 % Final    RDW-SD 02/04/2025 45.4  37.0 - 54.0 fl Final    MPV 02/04/2025 10.7  6.0 - 12.0 fL Final    Platelets 02/04/2025 275  140 - 450 10*3/mm3 Final    Neutrophil % 02/04/2025 53.0  42.7 - 76.0 % Final    Lymphocyte % 02/04/2025 33.2  19.6 - 45.3 % Final    Monocyte %  02/04/2025 8.1  5.0 - 12.0 % Final    Eosinophil % 02/04/2025 5.1  0.3 - 6.2 % Final    Basophil % 02/04/2025 0.4  0.0 - 1.5 % Final    Immature Grans % 02/04/2025 0.2  0.0 - 0.5 % Final    Neutrophils, Absolute 02/04/2025 4.49  1.70 - 7.00 10*3/mm3 Final    Lymphocytes, Absolute 02/04/2025 2.81  0.70 - 3.10 10*3/mm3 Final    Monocytes, Absolute 02/04/2025 0.69  0.10 - 0.90 10*3/mm3 Final    Eosinophils, Absolute 02/04/2025 0.43 (H)  0.00 - 0.40 10*3/mm3 Final    Basophils, Absolute 02/04/2025 0.03  0.00 - 0.20 10*3/mm3 Final    Immature Grans, Absolute 02/04/2025 0.02  0.00 - 0.05 10*3/mm3 Final       CT Soft Tissue Neck With Contrast    Result Date: 2/11/2025  Narrative: CT SOFT TISSUE NECK W CONTRAST Date of Exam: 2/11/2025 1:56 PM EST Indication: suprclavicular nodule on US. Comparison: Ultrasound 11/26/2024. Technique: Axial CT images were obtained of the neck after the uneventful intravenous administration of 80 mL Isovue-300.  Reconstructed coronal and sagittal images were also obtained. Automated exposure control and iterative construction methods were used. Findings: Limited intracranial evaluation demonstrates no acute findings. The orbits and visualized paranasal sinuses appear normal. There is no evidence of aerodigestive tract mass or other focal luminal abnormality. The thyroid appears homogeneous. Vascular structures demonstrate some calcific atherosclerotic change without definite high-grade stenosis. The parotid and submandibular glands appear normal and symmetric. There is no distinct pathologic cervical lymphadenopathy. A borderline enlarged right supraclavicular reticular lymph node is seen on image 62 of series 2 measuring 10 mm short axis. There is no definite left supraclavicular adenopathy to correlate with the 2 cm finding noted on recent ultrasound. There is prominent asymmetric arthrosis change of the left sternoclavicular joint with surrounding inflammatory change including a probable  large synovial cyst which does measure around 2.1 x 2.5 cm, projecting mildly above the sternoclavicular joint.     Impression: Impression: No definite pathologic adenopathy to correlate with the 2 cm soft tissue finding on recent ultrasound. A borderline enlarged but potentially benign 10 mm right supraclavicular lymph node is present. There is fairly prominent and asymmetric degenerative change of the left sternoclavicular joint including a circumscribed synovial cyst/joint effusion which measures around 2 cm and may account for the finding on ultrasound. Consider clinical follow-up and potential follow-up ultrasound if there is notable clinical change. Electronically Signed: Aguilar Canela MD  2/11/2025 4:05 PM EST  Workstation ID: [id number]     Assessment / Plan      Assessment/Plan:   1. 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  Did not require any adjuvant therapy.  Unfortunately pathology is not available  -Repeat 5-HIAA and chromogranin A within normal limits in 2021  -PET/CT without evidence of disease in November 2023  -5-HIAA and chromogranin A overall stable in February 2024  -Repeat labs in February 2025 within normal limits  -CT C/A/P personally reviewed and without evidence of metastatic or recurrent disease. Official radiology read pending     2. Fibrocystic breast changes, unspecified laterality  -Noted during her previous mammogram and biopsy several years ago  -Breast ultrasound in October 2023 with no concerning lesions    3. Back skin lesion  --Lipomatous appearing lesion noted on exam  --As it is causing discomfort, have referred to Dr. Gonzalez for evaluation         Follow Up:   Follow up in 6 months     Ariel Reyes MD  Hematology and Oncology     Please note that portions of this note may have been completed with a voice recognition program. Efforts were made to edit the dictations, but occasionally words are  mistranscribed.

## 2025-03-04 ENCOUNTER — LAB (OUTPATIENT)
Dept: LAB | Facility: HOSPITAL | Age: 75
End: 2025-03-04
Payer: MEDICARE

## 2025-03-04 ENCOUNTER — TRANSCRIBE ORDERS (OUTPATIENT)
Dept: LAB | Facility: HOSPITAL | Age: 75
End: 2025-03-04
Payer: MEDICARE

## 2025-03-04 DIAGNOSIS — Z01.812 PRE-OPERATIVE LABORATORY EXAMINATION: Primary | ICD-10-CM

## 2025-03-04 DIAGNOSIS — Z01.812 PRE-OPERATIVE LABORATORY EXAMINATION: ICD-10-CM

## 2025-03-04 LAB
ANION GAP SERPL CALCULATED.3IONS-SCNC: 8 MMOL/L (ref 5–15)
BUN SERPL-MCNC: 11 MG/DL (ref 8–23)
BUN/CREAT SERPL: 14.3 (ref 7–25)
CALCIUM SPEC-SCNC: 9.1 MG/DL (ref 8.6–10.5)
CHLORIDE SERPL-SCNC: 106 MMOL/L (ref 98–107)
CO2 SERPL-SCNC: 26 MMOL/L (ref 22–29)
CREAT SERPL-MCNC: 0.77 MG/DL (ref 0.57–1)
DEPRECATED RDW RBC AUTO: 46.5 FL (ref 37–54)
EGFRCR SERPLBLD CKD-EPI 2021: 81.1 ML/MIN/1.73
ERYTHROCYTE [DISTWIDTH] IN BLOOD BY AUTOMATED COUNT: 14.5 % (ref 12.3–15.4)
GLUCOSE SERPL-MCNC: 111 MG/DL (ref 65–99)
HCT VFR BLD AUTO: 42.7 % (ref 34–46.6)
HGB BLD-MCNC: 13.2 G/DL (ref 12–15.9)
MCH RBC QN AUTO: 27.2 PG (ref 26.6–33)
MCHC RBC AUTO-ENTMCNC: 30.9 G/DL (ref 31.5–35.7)
MCV RBC AUTO: 88 FL (ref 79–97)
PLATELET # BLD AUTO: 310 10*3/MM3 (ref 140–450)
PMV BLD AUTO: 10.8 FL (ref 6–12)
POTASSIUM SERPL-SCNC: 4.9 MMOL/L (ref 3.5–5.2)
RBC # BLD AUTO: 4.85 10*6/MM3 (ref 3.77–5.28)
SODIUM SERPL-SCNC: 140 MMOL/L (ref 136–145)
WBC NRBC COR # BLD AUTO: 7.97 10*3/MM3 (ref 3.4–10.8)

## 2025-03-04 PROCEDURE — 36415 COLL VENOUS BLD VENIPUNCTURE: CPT

## 2025-03-04 PROCEDURE — 85027 COMPLETE CBC AUTOMATED: CPT

## 2025-03-04 PROCEDURE — 80048 BASIC METABOLIC PNL TOTAL CA: CPT

## 2025-03-05 PROCEDURE — 88304 TISSUE EXAM BY PATHOLOGIST: CPT | Performed by: SURGERY

## 2025-03-06 ENCOUNTER — LAB REQUISITION (OUTPATIENT)
Dept: LAB | Facility: HOSPITAL | Age: 75
End: 2025-03-06
Payer: MEDICARE

## 2025-03-06 DIAGNOSIS — Z01.812 ENCOUNTER FOR PREPROCEDURAL LABORATORY EXAMINATION: ICD-10-CM

## 2025-03-07 LAB
CYTO UR: NORMAL
LAB AP CASE REPORT: NORMAL
LAB AP CLINICAL INFORMATION: NORMAL
PATH REPORT.FINAL DX SPEC: NORMAL
PATH REPORT.GROSS SPEC: NORMAL

## 2025-05-19 ENCOUNTER — TELEPHONE (OUTPATIENT)
Dept: CARDIOLOGY | Facility: CLINIC | Age: 75
End: 2025-05-19

## 2025-05-19 NOTE — TELEPHONE ENCOUNTER
Hub staff attempted to follow warm transfer process and was unsuccessful     Caller: Madai Cheney    Relationship to patient: Self    Best call back number: [phone]    Patient is needing: PATIENT IS AT HER ENT'S OFFICE, DR. DIANE, AND THEY ARE MAKING HER WAIT TO BE SEEN UNTIL DR. GARZA'S OFFICE SENDS OVER THE RECORDS OF HER SLEEP STUDY AND THE ASSOCIATES RECORDS. DR. DIANE'S OFFICE HAS BEEN TRYING TO GET [facility] OF OFFICE AS WELL AND NOT BEEN ABLE TO. ALSO PROVIDED PATIENT WITH THE NUMBER FOR MEDICAL RECORDS SO HER PROVIDER COULD CALL THERE TOO.

## 2025-06-05 ENCOUNTER — OFFICE VISIT (OUTPATIENT)
Dept: CARDIOLOGY | Facility: CLINIC | Age: 75
End: 2025-06-05
Payer: MEDICARE

## 2025-06-05 VITALS
HEART RATE: 68 BPM | BODY MASS INDEX: 32.98 KG/M2 | DIASTOLIC BLOOD PRESSURE: 70 MMHG | WEIGHT: 193.2 LBS | SYSTOLIC BLOOD PRESSURE: 122 MMHG | HEIGHT: 64 IN

## 2025-06-05 DIAGNOSIS — E88.810 METABOLIC SYNDROME: ICD-10-CM

## 2025-06-05 DIAGNOSIS — R01.1 HEART MURMUR: ICD-10-CM

## 2025-06-05 DIAGNOSIS — I10 PRIMARY HYPERTENSION: Primary | ICD-10-CM

## 2025-06-05 PROCEDURE — 3074F SYST BP LT 130 MM HG: CPT | Performed by: NURSE PRACTITIONER

## 2025-06-05 PROCEDURE — 3078F DIAST BP <80 MM HG: CPT | Performed by: NURSE PRACTITIONER

## 2025-06-05 PROCEDURE — 99214 OFFICE O/P EST MOD 30 MIN: CPT | Performed by: NURSE PRACTITIONER

## 2025-06-05 RX ORDER — NIFEDIPINE 30 MG/1
30 TABLET, EXTENDED RELEASE ORAL DAILY
Qty: 90 TABLET | Refills: 3 | Status: SHIPPED | OUTPATIENT
Start: 2025-06-05

## 2025-06-05 RX ORDER — CARVEDILOL 12.5 MG/1
12.5 TABLET ORAL 2 TIMES DAILY WITH MEALS
Qty: 180 TABLET | Refills: 3 | Status: SHIPPED | OUTPATIENT
Start: 2025-06-05
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
## 2025-08-21 ENCOUNTER — OFFICE VISIT (OUTPATIENT)
Dept: ONCOLOGY | Facility: CLINIC | Age: 75
End: 2025-08-21
Payer: MEDICARE

## 2025-08-21 ENCOUNTER — LAB (OUTPATIENT)
Dept: LAB | Facility: HOSPITAL | Age: 75
End: 2025-08-21
Payer: MEDICARE

## 2025-08-21 VITALS
TEMPERATURE: 97.7 F | BODY MASS INDEX: 32.44 KG/M2 | WEIGHT: 190 LBS | OXYGEN SATURATION: 95 % | DIASTOLIC BLOOD PRESSURE: 59 MMHG | RESPIRATION RATE: 16 BRPM | HEIGHT: 64 IN | HEART RATE: 61 BPM | SYSTOLIC BLOOD PRESSURE: 124 MMHG

## 2025-08-21 DIAGNOSIS — Z85.060 PERSONAL HISTORY OF MALIGNANT CARCINOID TUMOR OF SMALL INTESTINE: Primary | ICD-10-CM

## 2025-08-27 ENCOUNTER — HOSPITAL ENCOUNTER (OUTPATIENT)
Facility: HOSPITAL | Age: 75
Discharge: HOME | End: 2025-08-27
Payer: MEDICARE

## 2025-08-27 DIAGNOSIS — Z85.060 PERSONAL HISTORY OF MALIGNANT CARCINOID TUMOR OF SMALL INTESTINE: ICD-10-CM

## 2025-08-27 PROCEDURE — 34310000005 GALLIUM GA 68 DOTATATE KIT: Performed by: INTERNAL MEDICINE

## 2025-08-27 PROCEDURE — 78815 PET IMAGE W/CT SKULL-THIGH: CPT

## 2025-08-27 PROCEDURE — A9587 GALLIUM GA-68: HCPCS | Performed by: INTERNAL MEDICINE

## 2025-08-27 RX ADMIN — 68GA-DOTATATE 1 EACH: KIT INTRAVENOUS at 10:03

## 2025-08-28 ENCOUNTER — OFFICE VISIT (OUTPATIENT)
Dept: ONCOLOGY | Facility: CLINIC | Age: 75
End: 2025-08-28
Payer: MEDICARE

## 2025-08-28 VITALS
RESPIRATION RATE: 16 BRPM | DIASTOLIC BLOOD PRESSURE: 73 MMHG | HEIGHT: 64 IN | OXYGEN SATURATION: 95 % | BODY MASS INDEX: 32.1 KG/M2 | WEIGHT: 188 LBS | TEMPERATURE: 98.2 F | HEART RATE: 63 BPM | SYSTOLIC BLOOD PRESSURE: 127 MMHG

## 2025-08-28 DIAGNOSIS — Z85.060 PERSONAL HISTORY OF MALIGNANT CARCINOID TUMOR OF SMALL INTESTINE: Primary | ICD-10-CM
